# Patient Record
Sex: MALE | Race: WHITE | NOT HISPANIC OR LATINO | Employment: OTHER | URBAN - NONMETROPOLITAN AREA
[De-identification: names, ages, dates, MRNs, and addresses within clinical notes are randomized per-mention and may not be internally consistent; named-entity substitution may affect disease eponyms.]

---

## 2021-05-06 ENCOUNTER — OFFICE VISIT (OUTPATIENT)
Dept: CARDIOLOGY | Facility: CLINIC | Age: 76
End: 2021-05-06

## 2021-05-06 VITALS
OXYGEN SATURATION: 98 % | SYSTOLIC BLOOD PRESSURE: 136 MMHG | WEIGHT: 163 LBS | TEMPERATURE: 97.8 F | HEIGHT: 71 IN | HEART RATE: 66 BPM | RESPIRATION RATE: 12 BRPM | BODY MASS INDEX: 22.82 KG/M2 | DIASTOLIC BLOOD PRESSURE: 78 MMHG

## 2021-05-06 DIAGNOSIS — E78.5 HYPERLIPIDEMIA LDL GOAL <100: ICD-10-CM

## 2021-05-06 DIAGNOSIS — R01.1 HEART MURMUR: ICD-10-CM

## 2021-05-06 DIAGNOSIS — E11.65 TYPE 2 DIABETES MELLITUS WITH HYPERGLYCEMIA, WITHOUT LONG-TERM CURRENT USE OF INSULIN (HCC): ICD-10-CM

## 2021-05-06 DIAGNOSIS — I10 ESSENTIAL HYPERTENSION: Primary | ICD-10-CM

## 2021-05-06 PROBLEM — E11.9 TYPE 2 DIABETES MELLITUS, WITHOUT LONG-TERM CURRENT USE OF INSULIN: Status: ACTIVE | Noted: 2021-05-06

## 2021-05-06 PROCEDURE — 99204 OFFICE O/P NEW MOD 45 MIN: CPT | Performed by: INTERNAL MEDICINE

## 2021-05-06 PROCEDURE — 93000 ELECTROCARDIOGRAM COMPLETE: CPT | Performed by: INTERNAL MEDICINE

## 2021-05-06 RX ORDER — ASPIRIN 81 MG/1
TABLET ORAL
COMMUNITY

## 2021-05-06 RX ORDER — TAMSULOSIN HYDROCHLORIDE 0.4 MG/1
1 CAPSULE ORAL DAILY
COMMUNITY

## 2021-05-07 NOTE — PROGRESS NOTES
[facility] [place]  [facility] CARDIOLOGY  [address] [place] [address]  Dept: [phone]  Dept Fax: [phone]    Mahesh Renee  1945    New Patient Office Note    History of Present Illness:  Mahesh Renee is a 75 y.o. male who presents to the clinic for New patient.  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    Medications:  albuterol sulfate HFA  amLODIPine  aspirin  FLONASE SENSIMIST NA  glucose blood  Jardiance tablet  latanoprost  lisinopril-hydrochlorothiazide  metFORMIN  multivitamin tablet  PROSTATE HEALTH PO  rosuvastatin  tamsulosin capsule  Turmeric capsule  Vitamin D3 tablet    Subjective  No Known Allergies     Past Medical History:   Diagnosis Date   • Acute allergic rhinitis    • Benign essential hypertension    • Benign prostatic hyperplasia with urinary obstruction    • Bifascicular block    • BMI 23.0-23.9, adult    • Cholecystitis    • Chronic allergic rhinitis    • Diabetes mellitus without complication (CMS/HCC)    • Erectile dysfunction    • Extrinsic asthma    • High risk medication use    • Low back pain    • Mild intermittent asthma without complication    • Mixed hyperlipidemia    • Nocturia    • Nonrheumatic tricuspid valve regurgitation    • Obstructive sleep apnea syndrome    • Other obstructive and reflux uropathy    • Secondary pulmonary arterial hypertension (CMS/HCC)    •  "Tricuspid valve insufficiency    • Vitamin D deficiency        Past Surgical History:   Procedure Laterality Date   • CHOLECYSTECTOMY     • INGUINAL HERNIA REPAIR     • OTHER SURGICAL HISTORY      UVULA SURGER       Family History   Problem Relation Age of Onset   • Stroke Mother    • Other Mother         CAROTID OR VASCULAR DISEASE   • Hypertension Mother    • Hyperlipidemia Mother    • Arthritis Mother    • Breast cancer Mother    • Hypertension Father    • Arthritis Father    • Hyperlipidemia Father    • Other Father         CAROTID OR VASCULAR DISEASE   • Coronary artery disease Father    • Breast cancer Sister    • Hypertension Sister    • Diabetes Sister    • Hyperlipidemia Sister         Social History     Socioeconomic History   • Marital status:      Spouse name: Not on file   • Number of children: Not on file   • Years of education: Not on file   • Highest education level: Not on file   Tobacco Use   • Smoking status: Never Smoker   • Smokeless tobacco: Never Used   Substance and Sexual Activity   • Alcohol use: Not Currently   • Drug use: Never   • Sexual activity: Defer       Review of Systems   Constitutional: Negative.    HENT: Negative.    Respiratory: Negative.    Cardiovascular: Negative.    Endocrine: Negative.    Genitourinary: Negative.    Musculoskeletal: Negative.    Skin: Negative.    Allergic/Immunologic: Negative.    Neurological: Negative.    Hematological: Negative.    Psychiatric/Behavioral: Negative.        Cardiovascular Procedures    ECHO/MUGA:   STRESS TESTS:   CARDIAC CATH:   DEVICES:   HOLTER:   CT/MRI:   VASCULAR:   CARDIOTHORACIC:     Objective  Vitals:    05/06/21 1114   BP: 136/78   BP Location: Left arm   Patient Position: Lying   Cuff Size: Adult   Pulse: 66   Resp: 12   Temp: 97.8 °F (36.6 °C)   TempSrc: Infrared   SpO2: 98%   Weight: 73.9 kg (163 lb)   Height: 180.3 cm (71\")   PainSc: 0-No pain       Physical Exam  Constitutional:       Appearance: Healthy " appearance. Not in distress.   Neck:      Vascular: No JVR. JVD normal.   Pulmonary:      Effort: Pulmonary effort is normal.      Breath sounds: Normal breath sounds. No wheezing. No rhonchi. No rales.   Chest:      Chest wall: Not tender to palpatation.   Cardiovascular:      PMI at left midclavicular line. Normal rate. Regular rhythm. Normal S1. Normal S2.      Murmurs: There is a harsh midsystolic murmur at the URSB, radiating to the neck.      No gallop. No click. No rub.   Pulses:     Intact distal pulses.   Edema:     Peripheral edema absent.   Abdominal:      General: Bowel sounds are normal.      Palpations: Abdomen is soft.      Tenderness: There is no abdominal tenderness.   Musculoskeletal: Normal range of motion.         General: No tenderness. Skin:     General: Skin is warm and dry.   Neurological:      General: No focal deficit present.      Mental Status: Alert and oriented to person, place and time.          Diagnostic Data    ECG 12 Lead    Date/Time: 5/6/2021 10:41 PM  Performed by: Joseph Armstrong MD  Authorized by: Joseph Armstrong MD   Comparison: compared with previous ECG   Similar to previous ECG  Rhythm: sinus rhythm  Rate: normal  BPM: 67  Conduction: right bundle branch block  QRS axis: left              Assessment and Plan  Diagnoses and all orders for this visit:    Essential hypertension- The BP is fine, will keep same meds 140.80 on Lisinopril 20.25, Amlodipine 5mg    Hyperlipidemia LDL goal <100- On Crestor 5mg    Type 2 diabetes mellitus with hyperglycemia, without long-term current use of insulin (CMS/[facility])    Heart murmur- DAVID  Will review an echo  -     Adult Transthoracic Echo Complete W/ Cont if Necessary Per Protocol; Future  Bifascicular block- RBBB and LASHB, seems old, will observe     Other orders  -     Fluticasone Furoate (FLONASE SENSIMIST NA); fluticasone furoate   50mcg  -     tamsulosin (FLOMAX) 0.4 MG capsule 24 hr capsule; Take 1 capsule by mouth  Daily.  -     aspirin (aspirin) 81 MG EC tablet; aspirin 81 mg tablet,delayed release   Take 1 tablet every day by oral route.  -     Turmeric 450 MG capsule; turmeric        Return in about 3 weeks (around 5/27/2021) for Recheck.    Joseph Armstrong MD  05/06/2021

## 2021-05-27 ENCOUNTER — OFFICE VISIT (OUTPATIENT)
Dept: CARDIOLOGY | Facility: CLINIC | Age: 76
End: 2021-05-27

## 2021-05-27 VITALS
HEART RATE: 66 BPM | OXYGEN SATURATION: 96 % | HEIGHT: 71 IN | RESPIRATION RATE: 12 BRPM | WEIGHT: 168 LBS | BODY MASS INDEX: 23.52 KG/M2 | SYSTOLIC BLOOD PRESSURE: 130 MMHG | DIASTOLIC BLOOD PRESSURE: 70 MMHG | TEMPERATURE: 97 F

## 2021-05-27 DIAGNOSIS — I35.0 AORTIC STENOSIS, MILD: ICD-10-CM

## 2021-05-27 DIAGNOSIS — I10 ESSENTIAL HYPERTENSION: Primary | ICD-10-CM

## 2021-05-27 DIAGNOSIS — E78.5 HYPERLIPIDEMIA LDL GOAL <100: ICD-10-CM

## 2021-05-27 DIAGNOSIS — E11.65 TYPE 2 DIABETES MELLITUS WITH HYPERGLYCEMIA, WITHOUT LONG-TERM CURRENT USE OF INSULIN (HCC): ICD-10-CM

## 2021-05-27 PROCEDURE — 99214 OFFICE O/P EST MOD 30 MIN: CPT | Performed by: INTERNAL MEDICINE

## 2021-05-27 RX ORDER — DOXYCYCLINE HYCLATE 100 MG/1
100 CAPSULE ORAL DAILY
COMMUNITY
End: 2022-04-04

## 2021-05-27 NOTE — PROGRESS NOTES
[facility] [place]  [facility] CARDIOLOGY  [address] [place] [address]  Dept: [phone]  Dept Fax: [phone]    Mahesh Renee  1945    Follow Up Office Visit Note    History of Present Illness:  Mahesh Renee is a 75 y.o. male who presents to the clinic for Follow-up. Heart Murmur- He underwent an echo and this showed Mild AS MARSHA 1,1 , he is asymptomatic, will follow him up in 1 year with an echo    The following portions of the patient's history were reviewed and updated as appropriate: allergies, current medications, past family history, past medical history, past social history, past surgical history and problem list.    Medications:  albuterol sulfate HFA  amLODIPine  aspirin  doxycycline  FLONASE SENSIMIST NA  glucose blood  Jardiance tablet  latanoprost  lisinopril-hydrochlorothiazide  metFORMIN  multivitamin tablet  PROSTATE HEALTH PO  rosuvastatin  tamsulosin capsule  Turmeric capsule  Vitamin D3 tablet    Subjective  No Known Allergies     Past Medical History:   Diagnosis Date   • Acute allergic rhinitis    • Benign essential hypertension    • Benign prostatic hyperplasia with urinary obstruction    • Bifascicular block    • BMI 23.0-23.9, adult    • Cholecystitis    • Chronic allergic rhinitis    • Diabetes mellitus without complication (CMS/HCC)    • Erectile dysfunction    • Extrinsic asthma    • High risk medication use    • Low back pain    • Mild intermittent asthma without complication    • Mixed hyperlipidemia    • Nocturia    • Nonrheumatic tricuspid valve regurgitation    • Obstructive sleep apnea syndrome    • Other obstructive and reflux uropathy    • Secondary pulmonary arterial hypertension (CMS/HCC)    • Tricuspid valve insufficiency    • Vitamin D deficiency        Past Surgical History:   Procedure Laterality Date   • CHOLECYSTECTOMY     • INGUINAL HERNIA REPAIR     • OTHER SURGICAL HISTORY      UVULA SURGER       Family History   Problem  "Relation Age of Onset   • Stroke Mother    • Other Mother         CAROTID OR VASCULAR DISEASE   • Hypertension Mother    • Hyperlipidemia Mother    • Arthritis Mother    • Breast cancer Mother    • Hypertension Father    • Arthritis Father    • Hyperlipidemia Father    • Other Father         CAROTID OR VASCULAR DISEASE   • Coronary artery disease Father    • Breast cancer Sister    • Hypertension Sister    • Diabetes Sister    • Hyperlipidemia Sister         Social History     Socioeconomic History   • Marital status:      Spouse name: Not on file   • Number of children: Not on file   • Years of education: Not on file   • Highest education level: Not on file   Tobacco Use   • Smoking status: Never Smoker   • Smokeless tobacco: Never Used   Substance and Sexual Activity   • Alcohol use: Not Currently   • Drug use: Never   • Sexual activity: Defer       Review of Systems   Constitutional: Negative.    HENT: Negative.    Respiratory: Negative.    Cardiovascular: Negative.    Endocrine: Negative.    Genitourinary: Negative.    Musculoskeletal: Negative.    Skin: Negative.    Allergic/Immunologic: Negative.    Neurological: Negative.    Hematological: Negative.    Psychiatric/Behavioral: Negative.    All other systems reviewed and are negative.      Cardiovascular Procedures    ECHO/MUGA: Adult Transthoracic Echo Complete W/ Cont if Necessary Per Protocol (05/26/2021 09:25)    STRESS TESTS:   CARDIAC CATH:   DEVICES:   HOLTER:   CT/MRI:   VASCULAR:   CARDIOTHORACIC:     Objective  Vitals:    05/27/21 0957   BP: 130/70   BP Location: Left arm   Patient Position: Sitting   Cuff Size: Adult   Pulse: 66   Resp: 12   Temp: 97 °F (36.1 °C)   TempSrc: Infrared   SpO2: 96%   Weight: 76.2 kg (168 lb)   Height: 180.3 cm (71\")   PainSc: 0-No pain     Body mass index is 23.43 kg/m².     Physical Exam  Constitutional:       Appearance: Healthy appearance. Not in distress.   Neck:      Vascular: No JVR. JVD normal. "   Pulmonary:      Effort: Pulmonary effort is normal.      Breath sounds: Normal breath sounds. No wheezing. No rhonchi. No rales.   Chest:      Chest wall: Not tender to palpatation.   Cardiovascular:      PMI at left midclavicular line. Normal rate. Regular rhythm. Normal S1. Normal S2.      Murmurs: There is a grade 4/6 harsh midsystolic murmur at the URSB, radiating to the neck.      No gallop. No click. No rub.   Pulses:     Intact distal pulses.   Edema:     Peripheral edema absent.   Abdominal:      General: Bowel sounds are normal.      Palpations: Abdomen is soft.      Tenderness: There is no abdominal tenderness.   Musculoskeletal: Normal range of motion.         General: No tenderness. Skin:     General: Skin is warm and dry.   Neurological:      General: No focal deficit present.      Mental Status: Alert and oriented to person, place and time.          Diagnostic Data  Procedures    Assessment and Plan  Diagnoses and all orders for this visit:    Essential hypertension- The BP seems fine on Lisinopril 20,25 and Amlodipine 5mg    Hyperlipidemia LDL goal <100- Lipids are good on Crestor 5mg    Type 2 diabetes mellitus with hyperglycemia, without long-term current use of insulin (CMS/HCC)    Aortic stenosis, mild by recent echo 1,1 MARSHA. Asymptomatic, will follow up in 1 year, we discuss complaints   -     Adult Transthoracic Echo Complete W/ Cont if Necessary Per Protocol; Future    Other orders  -     doxycycline (VIBRAMYCIN) 100 MG capsule; Take 100 mg by mouth Daily.         Return in about 1 year (around 5/27/2022) for Recheck.    Joseph Armstrong MD  05/27/2021

## 2022-04-04 ENCOUNTER — OFFICE VISIT (OUTPATIENT)
Dept: FAMILY MEDICINE CLINIC | Facility: CLINIC | Age: 77
End: 2022-04-04

## 2022-04-04 VITALS
DIASTOLIC BLOOD PRESSURE: 76 MMHG | HEIGHT: 71 IN | OXYGEN SATURATION: 99 % | WEIGHT: 172 LBS | BODY MASS INDEX: 24.08 KG/M2 | HEART RATE: 62 BPM | SYSTOLIC BLOOD PRESSURE: 120 MMHG

## 2022-04-04 DIAGNOSIS — I10 ESSENTIAL HYPERTENSION: ICD-10-CM

## 2022-04-04 DIAGNOSIS — E11.9 TYPE 2 DIABETES MELLITUS WITHOUT COMPLICATION, WITHOUT LONG-TERM CURRENT USE OF INSULIN: Primary | ICD-10-CM

## 2022-04-04 DIAGNOSIS — E78.5 HYPERLIPIDEMIA LDL GOAL <100: ICD-10-CM

## 2022-04-04 PROBLEM — N13.8 BENIGN PROSTATIC HYPERPLASIA WITH URINARY OBSTRUCTION: Status: ACTIVE | Noted: 2019-10-24

## 2022-04-04 PROBLEM — I07.1 TRICUSPID VALVE REGURGITATION: Status: ACTIVE | Noted: 2018-04-05

## 2022-04-04 PROBLEM — I27.21 PULMONARY ARTERIAL HYPERTENSION: Status: ACTIVE | Noted: 2018-04-05

## 2022-04-04 PROBLEM — E55.9 VITAMIN D DEFICIENCY: Status: ACTIVE | Noted: 2022-04-04

## 2022-04-04 PROBLEM — I45.2 BIFASCICULAR BLOCK: Status: ACTIVE | Noted: 2022-04-04

## 2022-04-04 PROBLEM — J30.9 ALLERGIC RHINITIS: Status: ACTIVE | Noted: 2017-10-05

## 2022-04-04 PROBLEM — N40.1 BENIGN PROSTATIC HYPERPLASIA WITH URINARY OBSTRUCTION: Status: ACTIVE | Noted: 2019-10-24

## 2022-04-04 PROBLEM — J45.20 MILD INTERMITTENT ASTHMA: Status: ACTIVE | Noted: 2022-04-04

## 2022-04-04 LAB — POC MICROALBUMIN URINE: 20

## 2022-04-04 PROCEDURE — 36415 COLL VENOUS BLD VENIPUNCTURE: CPT | Performed by: PHYSICIAN ASSISTANT

## 2022-04-04 PROCEDURE — 99204 OFFICE O/P NEW MOD 45 MIN: CPT | Performed by: PHYSICIAN ASSISTANT

## 2022-04-04 PROCEDURE — 82044 UR ALBUMIN SEMIQUANTITATIVE: CPT | Performed by: PHYSICIAN ASSISTANT

## 2022-04-04 RX ORDER — ROSUVASTATIN CALCIUM 5 MG/1
5 TABLET, COATED ORAL DAILY
Qty: 90 TABLET | Refills: 1 | Status: SHIPPED | OUTPATIENT
Start: 2022-04-04 | End: 2022-10-04 | Stop reason: SDUPTHER

## 2022-04-04 RX ORDER — LISINOPRIL AND HYDROCHLOROTHIAZIDE 25; 20 MG/1; MG/1
1 TABLET ORAL DAILY
Qty: 90 TABLET | Refills: 1 | Status: SHIPPED | OUTPATIENT
Start: 2022-04-04 | End: 2022-10-04 | Stop reason: SDUPTHER

## 2022-04-04 RX ORDER — AMLODIPINE BESYLATE 10 MG/1
10 TABLET ORAL DAILY
Qty: 90 TABLET | Refills: 1 | Status: SHIPPED | OUTPATIENT
Start: 2022-04-04 | End: 2022-10-04 | Stop reason: SDUPTHER

## 2022-04-04 NOTE — PROGRESS NOTES
"Chief Complaint  Hyperlipidemia, Hypertension (Patient needs a refill on his med, he is fasting today.), and Diabetes (Patient needs a refill on his med)    Subjective          Mahesh Renee presents to [facility]  History of Present Illness   patient is here today for refills on his diabetic, hypertensive and cholesterol medications he also needs his test strips refilled.  He has no specific complaints today and reports feeling well.  He states his blood sugars are \"fairly well controlled\"  Objective     Vital Signs:   /76 (BP Location: Right arm, Patient Position: Sitting, Cuff Size: Adult)   Pulse 62   Ht 180.3 cm (71\")   Wt 78 kg (172 lb)   SpO2 99%   BMI 23.99 kg/m²     Body mass index is 23.99 kg/m².    Review of Systems   Constitutional: Negative.    HENT: Negative.    Respiratory: Negative.    Cardiovascular: Negative.    Gastrointestinal: Negative.    Neurological: Negative.    Psychiatric/Behavioral: Negative.        Past History:  Medical History: has a past medical history of Acute allergic rhinitis, Benign essential hypertension, Benign prostatic hyperplasia with urinary obstruction, Bifascicular block, BMI 23.0-23.9, adult, Cholecystitis, Chronic allergic rhinitis, Diabetes mellitus without complication (HCC), Erectile dysfunction, Extrinsic asthma, High risk medication use, Low back pain, Mild intermittent asthma without complication, Mixed hyperlipidemia, Nocturia, Nonrheumatic tricuspid valve regurgitation, Obstructive sleep apnea syndrome, Other obstructive and reflux uropathy, Secondary pulmonary arterial hypertension (HCC), Tricuspid valve insufficiency, and Vitamin D deficiency.   Surgical History: has a past surgical history that includes Other surgical history; Cholecystectomy; and Inguinal hernia repair.   Family History: family history includes Arthritis in his father and mother; Breast cancer in his mother and sister; Coronary artery disease in " his father; Diabetes in his sister; Hyperlipidemia in his father, mother, and sister; Hypertension in his father, mother, and sister; Other in his father and mother; Stroke in his mother.   Social History: reports that he has never smoked. He has never used smokeless tobacco. He reports previous alcohol use. He reports that he does not use drugs.      Current Outpatient Medications:   •  albuterol sulfate  (90 Base) MCG/ACT inhaler, Inhale 2 puffs Every 4 (Four) Hours As Needed for Wheezing., Disp: , Rfl:   •  amLODIPine (NORVASC) 10 MG tablet, Take 1 tablet by mouth Daily., Disp: 90 tablet, Rfl: 1  •  aspirin 81 MG EC tablet, aspirin 81 mg tablet,delayed release  Take 1 tablet every day by oral route., Disp: , Rfl:   •  empagliflozin (Jardiance) 25 MG tablet tablet, Take 1 tablet by mouth Every Morning., Disp: 90 tablet, Rfl: 1  •  Fluticasone Furoate (FLONASE SENSIMIST NA), fluticasone furoate  50mcg, Disp: , Rfl:   •  glucose blood test strip, 1 each by Other route Daily. Use as instructed  TRUETRACK TEST STRIPS, Disp: 100 each, Rfl: 1  •  latanoprost (XALATAN) 0.005 % ophthalmic solution, Apply 1 drop to eye(s) as directed by provider Daily., Disp: , Rfl:   •  lisinopril-hydrochlorothiazide (PRINZIDE,ZESTORETIC) 20-25 MG per tablet, Take 1 tablet by mouth Daily., Disp: 90 tablet, Rfl: 1  •  metFORMIN (GLUCOPHAGE) 500 MG tablet, Take 2 tablets by mouth Daily With Breakfast., Disp: 180 tablet, Rfl: 1  •  Misc Natural Products (PROSTATE HEALTH PO), Take  by mouth., Disp: , Rfl:   •  multivitamin (THERAGRAN) tablet tablet, Take 1 tablet by mouth Daily., Disp: , Rfl:   •  rosuvastatin (CRESTOR) 5 MG tablet, Take 1 tablet by mouth Daily., Disp: 90 tablet, Rfl: 1  •  tamsulosin (FLOMAX) 0.4 MG capsule 24 hr capsule, Take 1 capsule by mouth Daily., Disp: , Rfl:   •  Turmeric 450 MG capsule, turmeric, Disp: , Rfl:   •  Cholecalciferol (Vitamin D3) 50 MCG (2000 UT) tablet, Take 1 tablet by mouth Daily., Disp: ,  Rfl:     Allergies: Patient has no known allergies.    Physical Exam  Constitutional:       Appearance: Normal appearance.   Cardiovascular:      Rate and Rhythm: Normal rate and regular rhythm.      Pulses:           Dorsalis pedis pulses are 2+ on the right side and 2+ on the left side.        Posterior tibial pulses are 2+ on the right side and 2+ on the left side.      Heart sounds: Normal heart sounds.   Pulmonary:      Effort: Pulmonary effort is normal.      Breath sounds: Normal breath sounds.   Abdominal:      General: Bowel sounds are normal.      Palpations: Abdomen is soft.   Musculoskeletal:         General: Normal range of motion.      Cervical back: Normal range of motion.   Feet:      Right foot:      Protective Sensation: 5 sites tested. 5 sites sensed.      Skin integrity: Skin integrity normal.      Toenail Condition: Right toenails are normal.      Left foot:      Protective Sensation: 5 sites tested. 5 sites sensed.      Skin integrity: Skin integrity normal.      Toenail Condition: Left toenails are normal.   Neurological:      General: No focal deficit present.      Mental Status: He is alert and oriented to person, place, and time.   Psychiatric:         Mood and Affect: Mood normal.             Assessment and Plan   Diagnoses and all orders for this visit:    1. Type 2 diabetes mellitus without complication, without long-term current use of insulin (HCC) (Primary)  Assessment & Plan:  Continue present care no changes meds refilled.    Orders:  -     Comprehensive Metabolic Panel; Future  -     Hemoglobin A1c; Future    2. Essential hypertension  Assessment & Plan:  Continue present care no changes meds refilled.      3. Hyperlipidemia LDL goal <100  Assessment & Plan:  Continue present care no changes meds refilled.    Orders:  -     Lipid Panel; Future    Other orders  -     amLODIPine (NORVASC) 10 MG tablet; Take 1 tablet by mouth Daily.  Dispense: 90 tablet; Refill: 1  -     empagliflozin  (Jardiance) 25 MG tablet tablet; Take 1 tablet by mouth Every Morning.  Dispense: 90 tablet; Refill: 1  -     lisinopril-hydrochlorothiazide (PRINZIDE,ZESTORETIC) 20-25 MG per tablet; Take 1 tablet by mouth Daily.  Dispense: 90 tablet; Refill: 1  -     metFORMIN (GLUCOPHAGE) 500 MG tablet; Take 2 tablets by mouth Daily With Breakfast.  Dispense: 180 tablet; Refill: 1  -     rosuvastatin (CRESTOR) 5 MG tablet; Take 1 tablet by mouth Daily.  Dispense: 90 tablet; Refill: 1  -     glucose blood test strip; 1 each by Other route Daily. Use as instructed    TRUETRACK TEST STRIPS  Dispense: 100 each; Refill: 1          Follow Up   Return in about 6 months (around 10/4/2022) for Recheck Diabetes.  Patient was given instructions and counseling regarding his condition or for health maintenance advice. Please see specific information pulled into the AVS if appropriate.     Tete Linares PA-C

## 2022-04-05 ENCOUNTER — TELEPHONE (OUTPATIENT)
Dept: FAMILY MEDICINE CLINIC | Facility: CLINIC | Age: 77
End: 2022-04-05

## 2022-04-05 LAB
ALBUMIN SERPL-MCNC: 4.6 G/DL (ref 3.7–4.7)
ALBUMIN/GLOB SERPL: 2.1 {RATIO} (ref 1.2–2.2)
ALP SERPL-CCNC: 58 IU/L (ref 44–121)
ALT SERPL-CCNC: 21 IU/L (ref 0–44)
AST SERPL-CCNC: 18 IU/L (ref 0–40)
BILIRUB SERPL-MCNC: 0.7 MG/DL (ref 0–1.2)
BUN SERPL-MCNC: 15 MG/DL (ref 8–27)
BUN/CREAT SERPL: 20 (ref 10–24)
CALCIUM SERPL-MCNC: 9.5 MG/DL (ref 8.6–10.2)
CHLORIDE SERPL-SCNC: 100 MMOL/L (ref 96–106)
CHOLEST SERPL-MCNC: 123 MG/DL (ref 100–199)
CO2 SERPL-SCNC: 25 MMOL/L (ref 20–29)
CREAT SERPL-MCNC: 0.74 MG/DL (ref 0.76–1.27)
EGFRCR SERPLBLD CKD-EPI 2021: 95 ML/MIN/1.73
GLOBULIN SER CALC-MCNC: 2.2 G/DL (ref 1.5–4.5)
GLUCOSE SERPL-MCNC: 125 MG/DL (ref 65–99)
HBA1C MFR BLD: 6.5 % (ref 4.8–5.6)
HDLC SERPL-MCNC: 46 MG/DL
LDLC SERPL CALC-MCNC: 54 MG/DL (ref 0–99)
POTASSIUM SERPL-SCNC: 4 MMOL/L (ref 3.5–5.2)
PROT SERPL-MCNC: 6.8 G/DL (ref 6–8.5)
SODIUM SERPL-SCNC: 143 MMOL/L (ref 134–144)
TRIGL SERPL-MCNC: 130 MG/DL (ref 0–149)
VLDLC SERPL CALC-MCNC: 23 MG/DL (ref 5–40)

## 2022-04-05 NOTE — TELEPHONE ENCOUNTER
Pt says when he went to the pharm to  meds they told him we did not call in his test strips   When I asked him what kind he said he did not know what ever works with his machine    I looked in old system and is says truetrack

## 2022-04-07 ENCOUNTER — TELEPHONE (OUTPATIENT)
Dept: FAMILY MEDICINE CLINIC | Facility: CLINIC | Age: 77
End: 2022-04-07

## 2022-04-07 NOTE — TELEPHONE ENCOUNTER
Incoming Refill Request      Medication requested (name and dose):     TRUE METRIX BLOOD GLUCOSE TEST [facility]    [facility]    Additional details provided by patient:     2ND REQUEST    Best call back number:     [phone]    Does the patient have less than a 3 day supply:  [] Yes  [x] No    Luly Murray  04/07/22, 16:38 EDT

## 2022-04-08 ENCOUNTER — TELEPHONE (OUTPATIENT)
Dept: FAMILY MEDICINE CLINIC | Facility: CLINIC | Age: 77
End: 2022-04-08

## 2022-04-08 NOTE — TELEPHONE ENCOUNTER
PATIENT CALLED SAID THAT [facility] HAS STILL NOT RECEIVED THE TEST STRIPS THAT WERE SUPPOSED TO BE CALLED IN AFTER HIS VISIT ON 4/4/22. HE GOT THE REST OF THE MEDICATION THAT WAS CALLED IN, BUT NOT THE TEST STRIPS.

## 2022-04-12 ENCOUNTER — CLINICAL SUPPORT (OUTPATIENT)
Dept: FAMILY MEDICINE CLINIC | Facility: CLINIC | Age: 77
End: 2022-04-12

## 2022-04-12 DIAGNOSIS — J30.9 ALLERGIC RHINITIS, UNSPECIFIED SEASONALITY, UNSPECIFIED TRIGGER: ICD-10-CM

## 2022-04-12 PROCEDURE — 95117 IMMUNOTHERAPY INJECTIONS: CPT | Performed by: PHYSICIAN ASSISTANT

## 2022-05-10 ENCOUNTER — CLINICAL SUPPORT (OUTPATIENT)
Dept: FAMILY MEDICINE CLINIC | Facility: CLINIC | Age: 77
End: 2022-05-10

## 2022-05-10 DIAGNOSIS — J30.9 ALLERGIC RHINITIS, UNSPECIFIED SEASONALITY, UNSPECIFIED TRIGGER: Primary | ICD-10-CM

## 2022-05-10 PROCEDURE — 95117 IMMUNOTHERAPY INJECTIONS: CPT | Performed by: PHYSICIAN ASSISTANT

## 2022-05-27 ENCOUNTER — OFFICE VISIT (OUTPATIENT)
Dept: CARDIOLOGY | Facility: CLINIC | Age: 77
End: 2022-05-27

## 2022-05-27 VITALS
BODY MASS INDEX: 23.38 KG/M2 | SYSTOLIC BLOOD PRESSURE: 134 MMHG | WEIGHT: 167 LBS | HEIGHT: 71 IN | HEART RATE: 83 BPM | DIASTOLIC BLOOD PRESSURE: 72 MMHG | TEMPERATURE: 97 F | RESPIRATION RATE: 18 BRPM | OXYGEN SATURATION: 99 %

## 2022-05-27 DIAGNOSIS — E11.9 TYPE 2 DIABETES MELLITUS WITHOUT COMPLICATION, WITHOUT LONG-TERM CURRENT USE OF INSULIN: ICD-10-CM

## 2022-05-27 DIAGNOSIS — G47.33 OBSTRUCTIVE SLEEP APNEA SYNDROME: ICD-10-CM

## 2022-05-27 DIAGNOSIS — I45.2 BIFASCICULAR BLOCK: ICD-10-CM

## 2022-05-27 DIAGNOSIS — E78.5 HYPERLIPIDEMIA LDL GOAL <100: ICD-10-CM

## 2022-05-27 DIAGNOSIS — I10 ESSENTIAL HYPERTENSION: Primary | ICD-10-CM

## 2022-05-27 DIAGNOSIS — I35.0 AORTIC STENOSIS, MILD: ICD-10-CM

## 2022-05-27 PROBLEM — R01.1 HEART MURMUR: Status: RESOLVED | Noted: 2021-05-06 | Resolved: 2022-05-27

## 2022-05-27 PROCEDURE — 99214 OFFICE O/P EST MOD 30 MIN: CPT | Performed by: INTERNAL MEDICINE

## 2022-05-27 PROCEDURE — 93000 ELECTROCARDIOGRAM COMPLETE: CPT | Performed by: INTERNAL MEDICINE

## 2022-05-27 NOTE — PROGRESS NOTES
[facility] [place]  [facility] CARDIOLOGY  [address] [place] [address]  Dept: [phone]  Dept Fax: [phone]    Mahesh Renee  1945    Follow Up Office Visit Note    History of Present Illness:  Mahesh Renee is a 76 y.o. male who presents to the clinic for Follow up Aortic stenosis - He is asymptomatic, no chest pain, no SOB, no edema, BP is 140.80 EKg sinus with RBBB and lASHB, Hr is 60, recent echo no major changes MARSHA 1,2 cm , aortic velocity 3,0, mean gradient 20 , will keep same approach echo yearly    The following portions of the patient's history were reviewed and updated as appropriate: allergies, current medications, past family history, past medical history, past social history, past surgical history and problem list.    Medications:  albuterol sulfate HFA  amLODIPine  aspirin  empagliflozin tablet  FLONASE SENSIMIST NA  glucose blood  latanoprost  lisinopril-hydrochlorothiazide  metFORMIN  multivitamin tablet  PROSTATE HEALTH PO  rosuvastatin  tamsulosin capsule  Turmeric capsule  Vitamin D3 tablet    Subjective  No Known Allergies     Past Medical History:   Diagnosis Date   • Acute allergic rhinitis    • Benign essential hypertension    • Benign prostatic hyperplasia with urinary obstruction    • Bifascicular block    • BMI 23.0-23.9, adult    • Cholecystitis    • Chronic allergic rhinitis    • Diabetes mellitus without complication (HCC)    • Erectile dysfunction    • Extrinsic asthma    • High risk medication use    • Low back pain    • Mild intermittent asthma without complication    • Mixed hyperlipidemia    • Nocturia    • Nonrheumatic tricuspid valve regurgitation    • Obstructive sleep apnea syndrome    • Other obstructive and reflux uropathy    • Secondary pulmonary arterial hypertension (HCC)    • Tricuspid valve insufficiency    • Vitamin D deficiency        Past Surgical History:   Procedure Laterality Date   • CHOLECYSTECTOMY     • INGUINAL  "HERNIA REPAIR     • OTHER SURGICAL HISTORY      UVULA SURGER       Family History   Problem Relation Age of Onset   • Stroke Mother    • Other Mother         CAROTID OR VASCULAR DISEASE   • Hypertension Mother    • Hyperlipidemia Mother    • Arthritis Mother    • Breast cancer Mother    • Hypertension Father    • Arthritis Father    • Hyperlipidemia Father    • Other Father         CAROTID OR VASCULAR DISEASE   • Coronary artery disease Father    • Breast cancer Sister    • Hypertension Sister    • Diabetes Sister    • Hyperlipidemia Sister         Social History     Socioeconomic History   • Marital status:    Tobacco Use   • Smoking status: Never Smoker   • Smokeless tobacco: Never Used   Vaping Use   • Vaping Use: Never used   Substance and Sexual Activity   • Alcohol use: Not Currently   • Drug use: Never   • Sexual activity: Defer       Review of Systems   Constitutional: Negative.    HENT: Negative.    Respiratory: Negative.    Cardiovascular: Negative.    Endocrine: Negative.    Genitourinary: Negative.    Musculoskeletal: Negative.    Skin: Negative.    Allergic/Immunologic: Negative.    Neurological: Negative.    Hematological: Negative.    Psychiatric/Behavioral: Negative.        Cardiovascular Procedures    ECHO/MUGA:   STRESS TESTS:   CARDIAC CATH:   DEVICES:   HOLTER:   CT/MRI:   VASCULAR:   CARDIOTHORACIC:     Objective  Vitals:    05/27/22 1001   BP: 134/72   BP Location: Left arm   Patient Position: Sitting   Cuff Size: Adult   Pulse: 83   Resp: 18   Temp: 97 °F (36.1 °C)   TempSrc: Infrared   SpO2: 99%   Weight: 75.8 kg (167 lb)   Height: 180.3 cm (71\")   PainSc: 0-No pain     Body mass index is 23.29 kg/m².     Physical Exam  Constitutional:       Appearance: Healthy appearance. Not in distress.   Neck:      Vascular: No JVR. JVD normal.   Pulmonary:      Effort: Pulmonary effort is normal.      Breath sounds: Normal breath sounds. No wheezing. No rhonchi. No rales.   Chest:      Chest " wall: Not tender to palpatation.   Cardiovascular:      PMI at left midclavicular line. Normal rate. Regular rhythm. Normal S1. Normal S2.      Murmurs: There is a harsh midsystolic murmur at the URSB, radiating to the neck.      No gallop. No click. No rub.   Pulses:     Intact distal pulses.   Edema:     Peripheral edema absent.   Abdominal:      General: Bowel sounds are normal.      Palpations: Abdomen is soft.      Tenderness: There is no abdominal tenderness.   Musculoskeletal: Normal range of motion.         General: No tenderness. Skin:     General: Skin is warm and dry.   Neurological:      General: No focal deficit present.      Mental Status: Alert and oriented to person, place and time.          Diagnostic Data    ECG 12 Lead    Date/Time: 5/27/2022 10:54 AM  Performed by: Joseph Armstrong MD  Authorized by: Joseph Armstrong MD   Comparison: compared with previous ECG from 5/6/2021  Similar to previous ECG  Rhythm: sinus rhythm  Rate: normal  BPM: 60  Conduction: right bundle branch block and left anterior fascicular block  QRS axis: left    Clinical impression: abnormal EKG            Assessment and Plan  Diagnoses and all orders for this visit:    Essential hypertension- The BP is 140.80, will keep same meds amlodipine 10 mg and Lisinopril Hctz 20.25  -     Adult Transthoracic Echo Complete W/ Cont if Necessary Per Protocol; Future    Hyperlipidemia LDL goal <100- On Crestor 5mg. Lipids are good  -     Adult Transthoracic Echo Complete W/ Cont if Necessary Per Protocol; Future    Aortic stenosis, mild. No major changes MARSHA 1,2   -     Adult Transthoracic Echo Complete W/ Cont if Necessary Per Protocol; Future    Type 2 diabetes mellitus without complication, without long-term current use of insulin (HCC)    Bifascicular block- steady, no syncope, no palpitations, HR is good 60  -     Adult Transthoracic Echo Complete W/ Cont if Necessary Per Protocol; Future    Obstructive sleep apnea  syndrome- On CPAP         Return in about 1 year (around 5/27/2023) for Recheck.    Joseph Armstrong MD  05/27/2022

## 2022-06-08 ENCOUNTER — CLINICAL SUPPORT (OUTPATIENT)
Dept: FAMILY MEDICINE CLINIC | Facility: CLINIC | Age: 77
End: 2022-06-08

## 2022-06-08 DIAGNOSIS — J30.9 ALLERGIC RHINITIS, UNSPECIFIED SEASONALITY, UNSPECIFIED TRIGGER: Primary | ICD-10-CM

## 2022-06-08 PROCEDURE — 95117 IMMUNOTHERAPY INJECTIONS: CPT | Performed by: PHYSICIAN ASSISTANT

## 2022-07-07 ENCOUNTER — CLINICAL SUPPORT (OUTPATIENT)
Dept: FAMILY MEDICINE CLINIC | Facility: CLINIC | Age: 77
End: 2022-07-07

## 2022-07-07 DIAGNOSIS — J30.1 NON-SEASONAL ALLERGIC RHINITIS DUE TO POLLEN: Primary | ICD-10-CM

## 2022-07-07 PROCEDURE — 95117 IMMUNOTHERAPY INJECTIONS: CPT | Performed by: PHYSICIAN ASSISTANT

## 2022-08-03 ENCOUNTER — CLINICAL SUPPORT (OUTPATIENT)
Dept: FAMILY MEDICINE CLINIC | Facility: CLINIC | Age: 77
End: 2022-08-03

## 2022-08-03 DIAGNOSIS — J30.9 ALLERGIC RHINITIS, UNSPECIFIED SEASONALITY, UNSPECIFIED TRIGGER: Primary | ICD-10-CM

## 2022-08-03 PROCEDURE — 95117 IMMUNOTHERAPY INJECTIONS: CPT | Performed by: PHYSICIAN ASSISTANT

## 2022-08-31 ENCOUNTER — CLINICAL SUPPORT (OUTPATIENT)
Dept: FAMILY MEDICINE CLINIC | Facility: CLINIC | Age: 77
End: 2022-08-31

## 2022-08-31 DIAGNOSIS — J30.1 NON-SEASONAL ALLERGIC RHINITIS DUE TO POLLEN: Primary | ICD-10-CM

## 2022-08-31 PROCEDURE — 95117 IMMUNOTHERAPY INJECTIONS: CPT | Performed by: PHYSICIAN ASSISTANT

## 2022-09-28 ENCOUNTER — CLINICAL SUPPORT (OUTPATIENT)
Dept: FAMILY MEDICINE CLINIC | Facility: CLINIC | Age: 77
End: 2022-09-28

## 2022-09-28 DIAGNOSIS — J30.9 ALLERGIC RHINITIS, UNSPECIFIED SEASONALITY, UNSPECIFIED TRIGGER: Primary | ICD-10-CM

## 2022-09-28 PROCEDURE — 95117 IMMUNOTHERAPY INJECTIONS: CPT | Performed by: PHYSICIAN ASSISTANT

## 2022-10-04 ENCOUNTER — OFFICE VISIT (OUTPATIENT)
Dept: FAMILY MEDICINE CLINIC | Facility: CLINIC | Age: 77
End: 2022-10-04

## 2022-10-04 VITALS
BODY MASS INDEX: 24.08 KG/M2 | OXYGEN SATURATION: 100 % | SYSTOLIC BLOOD PRESSURE: 130 MMHG | HEART RATE: 64 BPM | HEIGHT: 71 IN | DIASTOLIC BLOOD PRESSURE: 70 MMHG | WEIGHT: 172 LBS

## 2022-10-04 DIAGNOSIS — E11.9 TYPE 2 DIABETES MELLITUS WITHOUT COMPLICATION, WITHOUT LONG-TERM CURRENT USE OF INSULIN: Primary | ICD-10-CM

## 2022-10-04 DIAGNOSIS — H40.1130 PRIMARY OPEN ANGLE GLAUCOMA OF BOTH EYES, UNSPECIFIED GLAUCOMA STAGE: ICD-10-CM

## 2022-10-04 DIAGNOSIS — N13.8 BPH WITH OBSTRUCTION/LOWER URINARY TRACT SYMPTOMS: ICD-10-CM

## 2022-10-04 DIAGNOSIS — Z11.59 NEED FOR HEPATITIS C SCREENING TEST: ICD-10-CM

## 2022-10-04 DIAGNOSIS — N40.1 BPH WITH OBSTRUCTION/LOWER URINARY TRACT SYMPTOMS: ICD-10-CM

## 2022-10-04 DIAGNOSIS — R01.1 CARDIAC MURMUR: ICD-10-CM

## 2022-10-04 DIAGNOSIS — J30.1 SEASONAL ALLERGIC RHINITIS DUE TO POLLEN: ICD-10-CM

## 2022-10-04 DIAGNOSIS — E78.2 HYPERLIPIDEMIA, MIXED: ICD-10-CM

## 2022-10-04 DIAGNOSIS — E55.9 VITAMIN D DEFICIENCY: ICD-10-CM

## 2022-10-04 DIAGNOSIS — E78.2 MIXED HYPERLIPIDEMIA: ICD-10-CM

## 2022-10-04 DIAGNOSIS — I10 ESSENTIAL HYPERTENSION: ICD-10-CM

## 2022-10-04 DIAGNOSIS — J45.20 MILD INTERMITTENT ASTHMA WITHOUT COMPLICATION: Chronic | ICD-10-CM

## 2022-10-04 DIAGNOSIS — I10 HYPERTENSION, ESSENTIAL: ICD-10-CM

## 2022-10-04 DIAGNOSIS — I36.1 NONRHEUMATIC TRICUSPID VALVE REGURGITATION: ICD-10-CM

## 2022-10-04 DIAGNOSIS — I35.0 AORTIC STENOSIS, MILD: ICD-10-CM

## 2022-10-04 DIAGNOSIS — I45.2 BIFASCICULAR BLOCK: ICD-10-CM

## 2022-10-04 PROBLEM — I07.1 TRICUSPID VALVE REGURGITATION: Chronic | Status: ACTIVE | Noted: 2018-04-05

## 2022-10-04 PROCEDURE — 99214 OFFICE O/P EST MOD 30 MIN: CPT | Performed by: FAMILY MEDICINE

## 2022-10-04 PROCEDURE — 36415 COLL VENOUS BLD VENIPUNCTURE: CPT | Performed by: FAMILY MEDICINE

## 2022-10-04 RX ORDER — AMLODIPINE BESYLATE 10 MG/1
10 TABLET ORAL DAILY
Qty: 90 TABLET | Refills: 1 | Status: SHIPPED | OUTPATIENT
Start: 2022-10-04 | End: 2023-03-07 | Stop reason: SDUPTHER

## 2022-10-04 RX ORDER — LISINOPRIL AND HYDROCHLOROTHIAZIDE 25; 20 MG/1; MG/1
1 TABLET ORAL DAILY
Qty: 90 TABLET | Refills: 1 | Status: SHIPPED | OUTPATIENT
Start: 2022-10-04 | End: 2023-03-07 | Stop reason: SINTOL

## 2022-10-04 RX ORDER — ROSUVASTATIN CALCIUM 5 MG/1
5 TABLET, COATED ORAL DAILY
Qty: 90 TABLET | Refills: 1 | Status: SHIPPED | OUTPATIENT
Start: 2022-10-04 | End: 2023-03-07 | Stop reason: SDUPTHER

## 2022-10-04 NOTE — PROGRESS NOTES
"Chief Complaint  Hypertension (Needing medication refilled) and Diabetes (Needing medication refilled)    Subjective    History of Present Illness:  Mahesh Renee is a 76 y.o. male who presents today for follow-up visit and medication refills.    Here to establish care with me.  Transferring from [facility].    History of diabetes, hypertension, hyperlipidemia, asthma on allergy shots, tricuspid regurgitation with chronic heart murmur and bifascicular block, BPH stable on Flomax with ongoing urology follow-up (Dr. Byers) with yearly PSA monitoring, and history of glaucoma with ongoing ophthalmology follow-up and management    Colonoscopy done last in 2016.  He did have 1 small polyp but was told to repeat in 10 years.  Requesting [facility] pathology from GI.    PSA up-to-date with urology.    Due for diabetic foot exam and he will consider getting that up-to-date at his annual wellness visit together.    Discussed vaccination updates and he will consider an annual wellness.    Fasting to get blood work up-to-date.    Objective   Vital Signs:   /70 (BP Location: Left arm, Patient Position: Sitting, Cuff Size: Adult)   Pulse 64   Ht 180.3 cm (71\")   Wt 78 kg (172 lb)   SpO2 100%   BMI 23.99 kg/m²     Review of Systems   Constitutional: Negative for appetite change, chills and fever.   HENT: Negative for hearing loss.         Vision issues stable with ongoing management from ophthalmology.   Eyes: Negative for blurred vision.   Respiratory: Negative for chest tightness.    Cardiovascular: Negative for chest pain.   Gastrointestinal: Negative for abdominal pain.   Musculoskeletal: Negative for gait problem.   Skin: Negative for rash.   Psychiatric/Behavioral: Negative for depressed mood.       Past History:  Medical History: has a past medical history of Acute allergic rhinitis, Benign essential hypertension, Benign prostatic hyperplasia with urinary obstruction, Bifascicular block, BMI 23.0-23.9, " adult, Cholecystitis, Chronic allergic rhinitis, Diabetes mellitus without complication (HCC), Erectile dysfunction, Extrinsic asthma, High risk medication use, Low back pain, Mild intermittent asthma without complication, Mixed hyperlipidemia, Nocturia, Nonrheumatic tricuspid valve regurgitation, Obstructive sleep apnea syndrome, Other obstructive and reflux uropathy, Secondary pulmonary arterial hypertension (HCC), Tricuspid valve insufficiency, and Vitamin D deficiency.   Surgical History: has a past surgical history that includes Other surgical history; Cholecystectomy; and Inguinal hernia repair.   Family History: family history includes Arthritis in his father and mother; Breast cancer in his mother and sister; Coronary artery disease in his father; Diabetes in his sister; Hyperlipidemia in his father, mother, and sister; Hypertension in his father, mother, and sister; Other in his father and mother; Stroke in his mother.   Social History: reports that he has never smoked. He has never used smokeless tobacco. He reports previous alcohol use. He reports that he does not use drugs.      Current Outpatient Medications:   •  albuterol sulfate  (90 Base) MCG/ACT inhaler, Inhale 2 puffs Every 4 (Four) Hours As Needed for Wheezing., Disp: , Rfl:   •  amLODIPine (NORVASC) 10 MG tablet, Take 1 tablet by mouth Daily., Disp: 90 tablet, Rfl: 1  •  aspirin 81 MG EC tablet, aspirin 81 mg tablet,delayed release  Take 1 tablet every day by oral route., Disp: , Rfl:   •  Cholecalciferol (Vitamin D3) 50 MCG (2000 UT) tablet, Take 1 tablet by mouth Daily., Disp: , Rfl:   •  empagliflozin (Jardiance) 25 MG tablet tablet, Take 1 tablet by mouth Every Morning., Disp: 90 tablet, Rfl: 1  •  Fluticasone Furoate (FLONASE SENSIMIST NA), fluticasone furoate  50mcg, Disp: , Rfl:   •  glucose blood test strip, 1 each by Other route Daily. Use as instructed. Dx E11.9  TRUEMETRIX TEST STRIPS, Disp: 100 each, Rfl: 1  •  latanoprost  (XALATAN) 0.005 % ophthalmic solution, Apply 1 drop to eye(s) as directed by provider Daily., Disp: , Rfl:   •  lisinopril-hydrochlorothiazide (PRINZIDE,ZESTORETIC) 20-25 MG per tablet, Take 1 tablet by mouth Daily., Disp: 90 tablet, Rfl: 1  •  metFORMIN (GLUCOPHAGE) 500 MG tablet, Take 1 tablet by mouth 2 (Two) Times a Day With Meals., Disp: 180 tablet, Rfl: 1  •  Misc Natural Products (PROSTATE HEALTH PO), Take  by mouth., Disp: , Rfl:   •  multivitamin (THERAGRAN) tablet tablet, Take 1 tablet by mouth Daily., Disp: , Rfl:   •  rosuvastatin (CRESTOR) 5 MG tablet, Take 1 tablet by mouth Daily., Disp: 90 tablet, Rfl: 1  •  tamsulosin (FLOMAX) 0.4 MG capsule 24 hr capsule, Take 1 capsule by mouth Daily., Disp: , Rfl:   •  Turmeric 450 MG capsule, turmeric, Disp: , Rfl:     Allergies: Patient has no known allergies.    Physical Exam  Constitutional:       Appearance: Normal appearance.   HENT:      Head: Normocephalic.      Right Ear: External ear normal.      Left Ear: External ear normal.      Nose: Nose normal.   Eyes:      Pupils: Pupils are equal, round, and reactive to light.   Cardiovascular:      Rate and Rhythm: Normal rate and regular rhythm.      Heart sounds: Murmur heard.     No friction rub. No gallop.      Comments: 2-3 out of 6 systolic murmur heard best at left sternal border.    Cardiology following-Dr. Armstrong  Pulmonary:      Effort: Pulmonary effort is normal.      Breath sounds: Normal breath sounds.   Musculoskeletal:         General: Normal range of motion.      Cervical back: Normal range of motion.   Skin:     General: Skin is warm and dry.   Neurological:      General: No focal deficit present.      Mental Status: He is alert.   Psychiatric:         Mood and Affect: Mood normal.         Behavior: Behavior normal.         Thought Content: Thought content normal.          Result Review                   Assessment and Plan  Diagnoses and all orders for this visit:    1. Type 2 diabetes  mellitus without complication, without long-term current use of insulin (HCC) (Primary)  Assessment & Plan:  Diabetes is improving with treatment.   Continue current treatment regimen.  Diabetes will be reassessed in 3 months.    Orders:  -     empagliflozin (Jardiance) 25 MG tablet tablet; Take 1 tablet by mouth Every Morning.  Dispense: 90 tablet; Refill: 1  -     lisinopril-hydrochlorothiazide (PRINZIDE,ZESTORETIC) 20-25 MG per tablet; Take 1 tablet by mouth Daily.  Dispense: 90 tablet; Refill: 1  -     metFORMIN (GLUCOPHAGE) 500 MG tablet; Take 1 tablet by mouth 2 (Two) Times a Day With Meals.  Dispense: 180 tablet; Refill: 1  -     glucose blood test strip; 1 each by Other route Daily. Use as instructed. Dx E11.9    TRUEMETRIX TEST STRIPS  Dispense: 100 each; Refill: 1  -     CBC Auto Differential; Future  -     Comprehensive Metabolic Panel; Future  -     Lipid Panel; Future  -     Hemoglobin A1c; Future  -     TSH; Future  -     T4, Free; Future  -     Vitamin B12; Future    2. Mixed hyperlipidemia  Assessment & Plan:  Lipid abnormalities are improving with treatment.  Pharmacotherapy as ordered.  Lipids will be reassessed in 3 months.    Orders:  -     rosuvastatin (CRESTOR) 5 MG tablet; Take 1 tablet by mouth Daily.  Dispense: 90 tablet; Refill: 1  -     CBC Auto Differential; Future  -     Comprehensive Metabolic Panel; Future  -     Lipid Panel; Future  -     TSH; Future  -     T4, Free; Future    3. Mild intermittent asthma without complication  Assessment & Plan:  Doing well with allergy shots and Flonase along with as needed use of albuterol inhaler.    Allergy follow-up ongoing        4. Essential hypertension  -     amLODIPine (NORVASC) 10 MG tablet; Take 1 tablet by mouth Daily.  Dispense: 90 tablet; Refill: 1  -     lisinopril-hydrochlorothiazide (PRINZIDE,ZESTORETIC) 20-25 MG per tablet; Take 1 tablet by mouth Daily.  Dispense: 90 tablet; Refill: 1  -     CBC Auto Differential; Future  -      Comprehensive Metabolic Panel; Future  -     Lipid Panel; Future  -     TSH; Future  -     T4, Free; Future    5. Primary open angle glaucoma of both eyes, unspecified glaucoma stage  Assessment & Plan:  Continue with ophthalmology      6. Hypertension, essential  Assessment & Plan:  Hypertension is improving with treatment.  Continue current treatment regimen.  Blood pressure will be reassessed at the next regular appointment.      7. Hyperlipidemia, mixed  Assessment & Plan:  Lipid abnormalities are improving with treatment.  Pharmacotherapy as ordered.  Lipids will be reassessed in 6 months.      8. Seasonal allergic rhinitis due to pollen  Assessment & Plan:  Continues with Flonase and allergy shots.  Ongoing follow-up with his allergist      9. BPH with obstruction/lower urinary tract symptoms  Assessment & Plan:  Ongoing urology follow-up.  Continues Flomax      10. Vitamin D deficiency  -     Vitamin D 25 Hydroxy; Future    11. Need for hepatitis C screening test  Assessment & Plan:  Discussed hep C screening and he would like this done with lab work today.    Orders:  -     HCV Antibody Rfx To Qnt PCR; Future    12. Cardiac murmur    13. Nonrheumatic tricuspid valve regurgitation  Assessment & Plan:  Ongoing follow-up with cardiology.  Murmur appreciated for the first time on exam today as he is establishing care with me.  No shortness of breath or chest pain or palpitation symptoms      14. Bifascicular block  Assessment & Plan:  Cardiology follow-up ongoing.  Heart rate in the 60s.  He does report they told him he would need a pacemaker at some point in the future but at this point is stable without intervention      15. Aortic stenosis, mild      BMI is within normal parameters. No other follow-up for BMI required.          Follow Up  Return in about 4 weeks (around 11/1/2022) for Annual physical, Medicare Wellness, Med recheck.    Sarkis Fairbanks MD

## 2022-10-04 NOTE — ASSESSMENT & PLAN NOTE
Doing well with allergy shots and Flonase along with as needed use of albuterol inhaler.    Allergy follow-up ongoing

## 2022-10-04 NOTE — ASSESSMENT & PLAN NOTE
Ongoing follow-up with cardiology.  Murmur appreciated for the first time on exam today as he is establishing care with me.  No shortness of breath or chest pain or palpitation symptoms

## 2022-10-04 NOTE — ASSESSMENT & PLAN NOTE
Cardiology follow-up ongoing.  Heart rate in the 60s.  He does report they told him he would need a pacemaker at some point in the future but at this point is stable without intervention

## 2022-10-05 LAB
25(OH)D3+25(OH)D2 SERPL-MCNC: 48.7 NG/ML (ref 30–100)
ALBUMIN SERPL-MCNC: 5 G/DL (ref 3.7–4.7)
ALBUMIN/GLOB SERPL: 2.1 {RATIO} (ref 1.2–2.2)
ALP SERPL-CCNC: 72 IU/L (ref 44–121)
ALT SERPL-CCNC: 23 IU/L (ref 0–44)
AST SERPL-CCNC: 19 IU/L (ref 0–40)
BASOPHILS # BLD AUTO: 0 X10E3/UL (ref 0–0.2)
BASOPHILS NFR BLD AUTO: 1 %
BILIRUB SERPL-MCNC: 0.5 MG/DL (ref 0–1.2)
BUN SERPL-MCNC: 14 MG/DL (ref 8–27)
BUN/CREAT SERPL: 18 (ref 10–24)
CALCIUM SERPL-MCNC: 9.5 MG/DL (ref 8.6–10.2)
CHLORIDE SERPL-SCNC: 100 MMOL/L (ref 96–106)
CHOLEST SERPL-MCNC: 146 MG/DL (ref 100–199)
CO2 SERPL-SCNC: 26 MMOL/L (ref 20–29)
CREAT SERPL-MCNC: 0.76 MG/DL (ref 0.76–1.27)
EGFRCR SERPLBLD CKD-EPI 2021: 94 ML/MIN/1.73
EOSINOPHIL # BLD AUTO: 0.2 X10E3/UL (ref 0–0.4)
EOSINOPHIL NFR BLD AUTO: 3 %
ERYTHROCYTE [DISTWIDTH] IN BLOOD BY AUTOMATED COUNT: 12.8 % (ref 11.6–15.4)
GLOBULIN SER CALC-MCNC: 2.4 G/DL (ref 1.5–4.5)
GLUCOSE SERPL-MCNC: 127 MG/DL (ref 70–99)
HBA1C MFR BLD: 6.9 % (ref 4.8–5.6)
HCT VFR BLD AUTO: 50 % (ref 37.5–51)
HCV AB S/CO SERPL IA: <0.1 S/CO RATIO (ref 0–0.9)
HCV AB SERPL QL IA: NORMAL
HDLC SERPL-MCNC: 50 MG/DL
HGB BLD-MCNC: 16.5 G/DL (ref 13–17.7)
IMM GRANULOCYTES # BLD AUTO: 0 X10E3/UL (ref 0–0.1)
IMM GRANULOCYTES NFR BLD AUTO: 0 %
LDLC SERPL CALC-MCNC: 69 MG/DL (ref 0–99)
LYMPHOCYTES # BLD AUTO: 2.1 X10E3/UL (ref 0.7–3.1)
LYMPHOCYTES NFR BLD AUTO: 39 %
MCH RBC QN AUTO: 29.4 PG (ref 26.6–33)
MCHC RBC AUTO-ENTMCNC: 33 G/DL (ref 31.5–35.7)
MCV RBC AUTO: 89 FL (ref 79–97)
MONOCYTES # BLD AUTO: 0.6 X10E3/UL (ref 0.1–0.9)
MONOCYTES NFR BLD AUTO: 10 %
NEUTROPHILS # BLD AUTO: 2.5 X10E3/UL (ref 1.4–7)
NEUTROPHILS NFR BLD AUTO: 47 %
PLATELET # BLD AUTO: 207 X10E3/UL (ref 150–450)
POTASSIUM SERPL-SCNC: 3.6 MMOL/L (ref 3.5–5.2)
PROT SERPL-MCNC: 7.4 G/DL (ref 6–8.5)
RBC # BLD AUTO: 5.61 X10E6/UL (ref 4.14–5.8)
SODIUM SERPL-SCNC: 145 MMOL/L (ref 134–144)
T4 FREE SERPL-MCNC: 1.45 NG/DL (ref 0.82–1.77)
TRIGL SERPL-MCNC: 157 MG/DL (ref 0–149)
TSH SERPL DL<=0.005 MIU/L-ACNC: 3.1 UIU/ML (ref 0.45–4.5)
VIT B12 SERPL-MCNC: 459 PG/ML (ref 232–1245)
VLDLC SERPL CALC-MCNC: 27 MG/DL (ref 5–40)
WBC # BLD AUTO: 5.4 X10E3/UL (ref 3.4–10.8)

## 2022-10-28 ENCOUNTER — CLINICAL SUPPORT (OUTPATIENT)
Dept: FAMILY MEDICINE CLINIC | Facility: CLINIC | Age: 77
End: 2022-10-28

## 2022-10-28 DIAGNOSIS — J30.9 ALLERGIC RHINITIS, UNSPECIFIED SEASONALITY, UNSPECIFIED TRIGGER: ICD-10-CM

## 2022-10-28 DIAGNOSIS — E11.9 TYPE 2 DIABETES MELLITUS WITHOUT COMPLICATION, WITHOUT LONG-TERM CURRENT USE OF INSULIN: ICD-10-CM

## 2022-10-28 PROCEDURE — 95117 IMMUNOTHERAPY INJECTIONS: CPT | Performed by: FAMILY MEDICINE

## 2022-11-01 ENCOUNTER — TELEPHONE (OUTPATIENT)
Dept: FAMILY MEDICINE CLINIC | Facility: CLINIC | Age: 77
End: 2022-11-01

## 2022-11-01 DIAGNOSIS — Z12.11 SCREENING FOR COLON CANCER: ICD-10-CM

## 2022-11-01 DIAGNOSIS — Z86.010 PERSONAL HISTORY OF COLONIC POLYPS: Primary | ICD-10-CM

## 2022-11-01 NOTE — TELEPHONE ENCOUNTER
Left voicemail for patient to call back, please see Dr. Minor message below    HUB CAN READ      ----- Message from Sarkis Fairbanks MD sent at 11/1/2022  8:59 AM EDT -----  THE MESSAGE BELOW IS ABLE TO BE GIVEN BY THE HUB.  THE HUB MAY SCHEDULE A FOLLOW-UP VISIT FOR THE PATIENT IF INDICATED IN THE MESSAGE BELOW...    Please call and let patient know that we received his colonoscopy report from December 2016.  He did have 1 polyp with recommendation to repeat in 5 years.    He is past due for follow-up colonoscopy given his polyp history.    I did put an order in his chart to get a colonoscopy up-to-date given his past history of polyps.    They will contact him with information to schedule the colonoscopy.

## 2022-11-01 NOTE — TELEPHONE ENCOUNTER
HUB RELAYED MESSAGE TO PATIENT. PATIENT STATES THAT HE WILL TALK MORE ABOUT THIS WITH DR HERNÁNDEZ AT HIS APPOINTMENT ON 11/7/22

## 2022-11-07 ENCOUNTER — OFFICE VISIT (OUTPATIENT)
Dept: FAMILY MEDICINE CLINIC | Facility: CLINIC | Age: 77
End: 2022-11-07

## 2022-11-07 VITALS
HEIGHT: 71 IN | BODY MASS INDEX: 24.08 KG/M2 | DIASTOLIC BLOOD PRESSURE: 72 MMHG | OXYGEN SATURATION: 99 % | SYSTOLIC BLOOD PRESSURE: 132 MMHG | WEIGHT: 172 LBS | HEART RATE: 69 BPM

## 2022-11-07 DIAGNOSIS — I10 ESSENTIAL HYPERTENSION: Chronic | ICD-10-CM

## 2022-11-07 DIAGNOSIS — H40.1130 PRIMARY OPEN ANGLE GLAUCOMA OF BOTH EYES, UNSPECIFIED GLAUCOMA STAGE: Chronic | ICD-10-CM

## 2022-11-07 DIAGNOSIS — I35.0 AORTIC STENOSIS, MILD: Chronic | ICD-10-CM

## 2022-11-07 DIAGNOSIS — E78.2 HYPERLIPIDEMIA, MIXED: Chronic | ICD-10-CM

## 2022-11-07 DIAGNOSIS — Z00.00 GENERAL MEDICAL EXAM: Primary | ICD-10-CM

## 2022-11-07 DIAGNOSIS — E78.2 MIXED HYPERLIPIDEMIA: Chronic | ICD-10-CM

## 2022-11-07 DIAGNOSIS — R01.1 CARDIAC MURMUR: Chronic | ICD-10-CM

## 2022-11-07 DIAGNOSIS — N40.1 BPH WITH OBSTRUCTION/LOWER URINARY TRACT SYMPTOMS: Chronic | ICD-10-CM

## 2022-11-07 DIAGNOSIS — I45.2 BIFASCICULAR BLOCK: Chronic | ICD-10-CM

## 2022-11-07 DIAGNOSIS — I36.1 NONRHEUMATIC TRICUSPID VALVE REGURGITATION: Chronic | ICD-10-CM

## 2022-11-07 DIAGNOSIS — J30.1 SEASONAL ALLERGIC RHINITIS DUE TO POLLEN: Chronic | ICD-10-CM

## 2022-11-07 DIAGNOSIS — Z23 NEED FOR PROPHYLACTIC VACCINATION AGAINST STREPTOCOCCUS PNEUMONIAE (PNEUMOCOCCUS): ICD-10-CM

## 2022-11-07 DIAGNOSIS — N13.8 BPH WITH OBSTRUCTION/LOWER URINARY TRACT SYMPTOMS: Chronic | ICD-10-CM

## 2022-11-07 DIAGNOSIS — I10 HYPERTENSION, ESSENTIAL: Chronic | ICD-10-CM

## 2022-11-07 DIAGNOSIS — E55.9 VITAMIN D DEFICIENCY: Chronic | ICD-10-CM

## 2022-11-07 DIAGNOSIS — E11.9 TYPE 2 DIABETES MELLITUS WITHOUT COMPLICATION, WITHOUT LONG-TERM CURRENT USE OF INSULIN: Chronic | ICD-10-CM

## 2022-11-07 DIAGNOSIS — J45.20 MILD INTERMITTENT ASTHMA WITHOUT COMPLICATION: Chronic | ICD-10-CM

## 2022-11-07 PROCEDURE — 1159F MED LIST DOCD IN RCRD: CPT | Performed by: FAMILY MEDICINE

## 2022-11-07 PROCEDURE — G0009 ADMIN PNEUMOCOCCAL VACCINE: HCPCS | Performed by: FAMILY MEDICINE

## 2022-11-07 PROCEDURE — G0439 PPPS, SUBSEQ VISIT: HCPCS | Performed by: FAMILY MEDICINE

## 2022-11-07 PROCEDURE — 96160 PT-FOCUSED HLTH RISK ASSMT: CPT | Performed by: FAMILY MEDICINE

## 2022-11-07 PROCEDURE — 90677 PCV20 VACCINE IM: CPT | Performed by: FAMILY MEDICINE

## 2022-11-07 PROCEDURE — 99397 PER PM REEVAL EST PAT 65+ YR: CPT | Performed by: FAMILY MEDICINE

## 2022-11-07 PROCEDURE — 1170F FXNL STATUS ASSESSED: CPT | Performed by: FAMILY MEDICINE

## 2022-11-07 NOTE — ASSESSMENT & PLAN NOTE
We reviewed together health maintenance, screening, and vaccination options at his annual wellness visit and physical today.    Updated vaccinations with Prevnar 20.    Encourage COVID booster.    Encouraged advance directive.    Reviewed together fasting labs with good diabetes control with A1c at 6.9.    Continues to follow-up with cardiology given history of murmur    Recheck in 4 to 6 months or sooner if problems arise

## 2022-11-07 NOTE — PROGRESS NOTES
QUICK REFERENCE INFORMATION:  The ABCs of the Annual Wellness Visit    Subsequent Medicare Wellness Visit    @[email or web address]@    HEALTH RISK ASSESSMENT    1945    Recent Hospitalizations:  No hospitalization(s) within the last year..        Current Medical Providers:  Patient Care Team:  Sarkis Fairbanks MD as PCP - General (Family Medicine)  Joseph Armstrong MD as Consulting Physician (Cardiology)        Smoking Status:  Social History     Tobacco Use   Smoking Status Never   Smokeless Tobacco Never       Alcohol Consumption:  Social History     Substance and Sexual Activity   Alcohol Use Not Currently       Depression Screen:   PHQ-2/PHQ-9 Depression Screening 11/7/2022   Little Interest or Pleasure in Doing Things 0-->not at all   Feeling Down, Depressed or Hopeless -   PHQ-9: Brief Depression Severity Measure Score 0       Health Habits and Functional and Cognitive Screening:  Functional & Cognitive Status 11/7/2022   Do you have difficulty preparing food and eating? No   Do you have difficulty bathing yourself, getting dressed or grooming yourself? No   Do you have difficulty using the toilet? No   Do you have difficulty moving around from place to place? No   Do you have trouble with steps or getting out of a bed or a chair? Yes   Current Diet Frequent Junk Food   Dental Exam Up to date   Eye Exam Up to date   Exercise (times per week) 7 times per week   Current Exercises Include Walking;Yard Work;House Cleaning   Do you need help using the phone?  No   Are you deaf or do you have serious difficulty hearing?  No   Do you need help with transportation? No   Do you need help shopping? No   Do you need help preparing meals?  No   Do you need help with housework?  No   Do you need help with laundry? No   Do you need help taking your medications? No   Do you need help managing money? No   Do you ever drive or ride in a car without wearing a seat belt? No   Have you felt unusual stress, anger or loneliness  in the last month? No   Who do you live with? Alone   If you need help, do you have trouble finding someone available to you? No   Have you been bothered in the last four weeks by sexual problems? No   Do you have difficulty concentrating, remembering or making decisions? No       Fall Risk Screen:  FREYA Fall Risk Assessment was completed, and patient is at LOW risk for falls.Assessment completed on:11/7/2022    ACE III MINI        Does the patient have evidence of cognitive impairment? No    Aspirin use counseling: Taking ASA appropriately as indicated    Recent Lab Results:  CMP:  Lab Results   Component Value Date    BUN 14 10/04/2022    CREATININE 0.76 10/04/2022    BCR 18 10/04/2022     (H) 10/04/2022    K 3.6 10/04/2022    CO2 26 10/04/2022    CALCIUM 9.5 10/04/2022    PROTENTOTREF 7.4 10/04/2022    ALBUMIN 5.0 (H) 10/04/2022    LABGLOBREF 2.4 10/04/2022    LABIL2 2.1 10/04/2022    BILITOT 0.5 10/04/2022    ALKPHOS 72 10/04/2022    AST 19 10/04/2022    ALT 23 10/04/2022     HbA1c:  Lab Results   Component Value Date    HGBA1C 6.9 (H) 10/04/2022    HGBA1C 6.5 (H) 04/04/2022     Microalbumin:  Lab Results   Component Value Date    POCMALB 20 04/04/2022     Lipid Panel  Lab Results   Component Value Date    TRIG 157 (H) 10/04/2022    HDL 50 10/04/2022    LDL 69 10/04/2022    AST 19 10/04/2022    ALT 23 10/04/2022       Visual Acuity:  No results found.    Age-appropriate Screening Schedule:  Refer to the list below for future screening recommendations based on patient's age, sex and/or medical conditions. Orders for these recommended tests are listed in the plan section. The patient has been provided with a written plan.    Health Maintenance   Topic Date Due   • DIABETIC EYE EXAM  02/09/2023   • HEMOGLOBIN A1C  04/04/2023   • URINE MICROALBUMIN  04/04/2023   • LIPID PANEL  10/04/2023   • DIABETIC FOOT EXAM  11/07/2023   • TDAP/TD VACCINES (3 - Td or Tdap) 09/02/2029   • INFLUENZA VACCINE  Completed   •  ZOSTER VACCINE  Discontinued        Subjective   History of Present Illness    Mahesh Renee is a 77 y.o. male who presents for a Subsequent Wellness Visit and physical exam.    Doing well after he establish care with me last month.    Willing to get diabetic foot exam done today.    He already completed Shingrix at his pharmacy and will try to get a states for chart update.    Encouraged advance directive and he will consider.    Considering getting COVID booster at his pharmacy.    Willing to get Prevnar 20 today for vaccination update.    History of diabetes, hypertension, hyperlipidemia, asthma on allergy shots, tricuspid regurgitation with chronic heart murmur and bifascicular block, BPH stable on Flomax with ongoing urology follow-up (Dr. Byers) with yearly PSA monitoring, and history of glaucoma with ongoing ophthalmology follow-up and management     Colonoscopy done last in 2016.  He did have 1 small polyp but was told to repeat in 10 years.  Requested report from gastroenterology.  He did have 1 hyperplastic polyp so would not be due for repeat until 2026 if he would even like to continue colonoscopies at that time.  Updated his care gap.     PSA up-to-date with urology.        CHRONIC CONDITIONS    The following portions of the patient's history were reviewed and updated as appropriate: allergies, current medications, past family history, past medical history, past social history, past surgical history and problem list.    Outpatient Medications Prior to Visit   Medication Sig Dispense Refill   • albuterol sulfate  (90 Base) MCG/ACT inhaler Inhale 2 puffs Every 4 (Four) Hours As Needed for Wheezing.     • amLODIPine (NORVASC) 10 MG tablet Take 1 tablet by mouth Daily. 90 tablet 1   • aspirin 81 MG EC tablet aspirin 81 mg tablet,delayed release   Take 1 tablet every day by oral route.     • Cholecalciferol (Vitamin D3) 50 MCG (2000 UT) tablet Take 1 tablet by mouth Daily.     • empagliflozin  (Jardiance) 25 MG tablet tablet Take 1 tablet by mouth Every Morning. 90 tablet 1   • Fluticasone Furoate (FLONASE SENSIMIST NA) fluticasone furoate   50mcg     • glucose blood test strip 1 each by Other route Daily. Used to test blood sugar once a day for non-insulin-dependent diabetes.  Dx E11.9 TRUEMETRIX TEST STRIPS 100 each 3   • lisinopril-hydrochlorothiazide (PRINZIDE,ZESTORETIC) 20-25 MG per tablet Take 1 tablet by mouth Daily. 90 tablet 1   • metFORMIN (GLUCOPHAGE) 500 MG tablet Take 1 tablet by mouth 2 (Two) Times a Day With Meals. 180 tablet 1   • Misc Natural Products (PROSTATE HEALTH PO) Take  by mouth.     • multivitamin (THERAGRAN) tablet tablet Take 1 tablet by mouth Daily.     • rosuvastatin (CRESTOR) 5 MG tablet Take 1 tablet by mouth Daily. 90 tablet 1   • tamsulosin (FLOMAX) 0.4 MG capsule 24 hr capsule Take 1 capsule by mouth Daily.     • Turmeric 450 MG capsule turmeric     • latanoprost (XALATAN) 0.005 % ophthalmic solution Apply 1 drop to eye(s) as directed by provider Daily.       No facility-administered medications prior to visit.       Patient Active Problem List   Diagnosis   • Essential hypertension   • Hyperlipidemia LDL goal <100   • Type 2 diabetes mellitus, without long-term current use of insulin (HCC)   • Aortic stenosis, mild   • Allergic rhinitis   • Benign prostatic hyperplasia with urinary obstruction   • Bifascicular block   • Mild intermittent asthma   • Tricuspid valve regurgitation   • Pulmonary arterial hypertension (HCC)   • Vitamin D deficiency   • Benign essential hypertension   • Mixed hyperlipidemia   • Primary open angle glaucoma (POAG) of both eyes   • Hypertension, essential   • Hyperlipidemia, mixed   • Seasonal allergic rhinitis due to pollen   • BPH with obstruction/lower urinary tract symptoms   • Need for hepatitis C screening test   • Cardiac murmur   • General medical exam   • Need for prophylactic vaccination against Streptococcus pneumoniae (pneumococcus)        Advance Care Planning:  ACP discussion was held with the patient during this visit. Patient has an advance directive (not in EMR), copy requested.    Identification of Risk Factors:  Risk factors include: Advance Directive Discussion  Cardiovascular risk  Fall Risk  Glaucoma Risk  Immunizations Discussed/Encouraged (specific immunizations; Prevnar 20 (Pneumococcal 20-valent conjugate) and COVID19 ).    Review of Systems   Constitutional: Negative for appetite change, chills, fever and unexpected weight change.   HENT: Negative for hearing loss.    Eyes: Negative for visual disturbance.   Respiratory: Negative for chest tightness, shortness of breath and wheezing.    Cardiovascular: Negative for chest pain, palpitations and leg swelling.   Gastrointestinal: Negative for abdominal pain.   Musculoskeletal: Negative for arthralgias, back pain and gait problem.   Skin: Negative for rash.   Neurological: Negative for dizziness and headaches.   Psychiatric/Behavioral: Negative for agitation and confusion. The patient is not nervous/anxious.        Compared to one year ago, the patient feels his physical health is the same.  Compared to one year ago, the patient feels his mental health is the same.    Objective     Physical Exam  Constitutional:       Appearance: Normal appearance.   HENT:      Head: Normocephalic.      Right Ear: External ear normal.      Left Ear: External ear normal.      Nose: Nose normal.   Eyes:      Pupils: Pupils are equal, round, and reactive to light.   Cardiovascular:      Rate and Rhythm: Normal rate and regular rhythm.      Pulses:           Dorsalis pedis pulses are 3+ on the right side and 3+ on the left side.        Posterior tibial pulses are 3+ on the right side and 3+ on the left side.      Heart sounds: Murmur heard.    Systolic murmur is present with a grade of 3/6.    No friction rub. No gallop. No S3 or S4 sounds.   Pulmonary:      Effort: Pulmonary effort is normal.      Breath  "sounds: Normal breath sounds.   Musculoskeletal:         General: Normal range of motion.      Cervical back: Normal range of motion.      Right foot: Normal range of motion. No deformity, bunion or foot drop.      Left foot: Normal range of motion. No deformity, bunion or foot drop.   Feet:      Right foot:      Protective Sensation: 10 sites tested. 10 sites sensed.      Skin integrity: Skin integrity normal.      Toenail Condition: Right toenails are normal.      Left foot:      Protective Sensation: 10 sites tested. 10 sites sensed.      Skin integrity: Skin integrity normal.      Toenail Condition: Left toenails are normal.      Comments: Diabetic Foot Exam Performed and Monofilament Test Performed     Skin:     General: Skin is warm and dry.   Neurological:      General: No focal deficit present.      Mental Status: He is alert.   Psychiatric:         Mood and Affect: Mood normal.         Behavior: Behavior normal.         Thought Content: Thought content normal.          Procedures     Vitals:    11/07/22 0931   BP: 132/72   BP Location: Left arm   Patient Position: Sitting   Cuff Size: Adult   Pulse: 69   SpO2: 99%   Weight: 78 kg (172 lb)   Height: 180.3 cm (71\")       BMI is within normal parameters. No other follow-up for BMI required.      Lab Results   Component Value Date    WBC 5.4 10/04/2022    HGB 16.5 10/04/2022    HCT 50.0 10/04/2022    MCV 89 10/04/2022     10/04/2022     Lab Results   Component Value Date    GLUCOSE 127 (H) 10/04/2022    BUN 14 10/04/2022    CREATININE 0.76 10/04/2022    BCR 18 10/04/2022    K 3.6 10/04/2022    CO2 26 10/04/2022    CALCIUM 9.5 10/04/2022    PROTENTOTREF 7.4 10/04/2022    ALBUMIN 5.0 (H) 10/04/2022    LABIL2 2.1 10/04/2022    AST 19 10/04/2022    ALT 23 10/04/2022     Lab Results   Component Value Date    TSH 3.100 10/04/2022     No results found for: PSA  Lab Results   Component Value Date    CHLPL 146 10/04/2022    TRIG 157 (H) 10/04/2022    HDL 50 " 10/04/2022    LDL 69 10/04/2022       Assessment & Plan   Problem List Items Addressed This Visit        Allergies and Adverse Reactions    Seasonal allergic rhinitis due to pollen (Chronic)    Current Assessment & Plan     Stable with current regimen            Cardiac and Vasculature    Essential hypertension (Chronic)    Current Assessment & Plan     Hypertension is improving with treatment.  Continue current treatment regimen.  Blood pressure will be reassessed at the next regular appointment.         Relevant Medications    amLODIPine (NORVASC) 10 MG tablet    lisinopril-hydrochlorothiazide (PRINZIDE,ZESTORETIC) 20-25 MG per tablet    Aortic stenosis, mild (Chronic)    Current Assessment & Plan     Stable murmur.  No syncope or palpitations or fluttering.  Ongoing follow-up with cardiology         Relevant Medications    amLODIPine (NORVASC) 10 MG tablet    Bifascicular block (Chronic)    Tricuspid valve regurgitation (Chronic)    Relevant Medications    amLODIPine (NORVASC) 10 MG tablet    Mixed hyperlipidemia (Chronic)    Current Assessment & Plan     Lipid abnormalities are improving with treatment.  Pharmacotherapy as ordered.  Lipids will be reassessed in 6 months.         Relevant Medications    rosuvastatin (CRESTOR) 5 MG tablet    Hypertension, essential (Chronic)    Current Assessment & Plan     Hypertension is improving with treatment.  Continue current treatment regimen.  Blood pressure will be reassessed at the next regular appointment.         Relevant Medications    amLODIPine (NORVASC) 10 MG tablet    lisinopril-hydrochlorothiazide (PRINZIDE,ZESTORETIC) 20-25 MG per tablet    Hyperlipidemia, mixed (Chronic)    Current Assessment & Plan     Lipid abnormalities are improving with treatment.  Pharmacotherapy as ordered.  Lipids will be reassessed in 6 months.         Relevant Medications    rosuvastatin (CRESTOR) 5 MG tablet    Cardiac murmur (Chronic)       Endocrine and Metabolic    Type 2  diabetes mellitus, without long-term current use of insulin (HCC) (Chronic)    Current Assessment & Plan     Diabetes is improving with treatment.   Continue current treatment regimen.  Diabetes will be reassessed in 3 months.         Relevant Medications    empagliflozin (Jardiance) 25 MG tablet tablet    lisinopril-hydrochlorothiazide (PRINZIDE,ZESTORETIC) 20-25 MG per tablet    metFORMIN (GLUCOPHAGE) 500 MG tablet    glucose blood test strip    Vitamin D deficiency (Chronic)       Eye    Primary open angle glaucoma (POAG) of both eyes (Chronic)    Relevant Medications    latanoprost (XALATAN) 0.005 % ophthalmic solution       Genitourinary and Reproductive     BPH with obstruction/lower urinary tract symptoms (Chronic)    Current Assessment & Plan     Stable with current regimen         Relevant Medications    tamsulosin (FLOMAX) 0.4 MG capsule 24 hr capsule       Health Encounters    General medical exam - Primary    Current Assessment & Plan     We reviewed together health maintenance, screening, and vaccination options at his annual wellness visit and physical today.    Updated vaccinations with Prevnar 20.    Encourage COVID booster.    Encouraged advance directive.    Reviewed together fasting labs with good diabetes control with A1c at 6.9.    Continues to follow-up with cardiology given history of murmur    Recheck in 4 to 6 months or sooner if problems arise            Infectious Diseases    Need for prophylactic vaccination against Streptococcus pneumoniae (pneumococcus)       Pulmonary and Pneumonias    Mild intermittent asthma (Chronic)    Current Assessment & Plan     Lungs clear.  No wheezing.    Continue current regimen.           Relevant Medications    albuterol sulfate  (90 Base) MCG/ACT inhaler     Patient Self-Management and Personalized Health Advice  The patient has been provided with information about: diet, exercise and weight management and preventive services including:   · Annual  Wellness Visit (AWV)  · Hepatitis C Virus Screening (beneficiaries must fall into one of the following categories to be eligible- high risk for HCV infection, born between [birth year]16[birth year]2, or history of blood transfusion before 1992)  · Pneumococcal Vaccine and Administration.    Outpatient Encounter Medications as of 11/7/2022   Medication Sig Dispense Refill   • albuterol sulfate  (90 Base) MCG/ACT inhaler Inhale 2 puffs Every 4 (Four) Hours As Needed for Wheezing.     • amLODIPine (NORVASC) 10 MG tablet Take 1 tablet by mouth Daily. 90 tablet 1   • aspirin 81 MG EC tablet aspirin 81 mg tablet,delayed release   Take 1 tablet every day by oral route.     • Cholecalciferol (Vitamin D3) 50 MCG (2000 UT) tablet Take 1 tablet by mouth Daily.     • empagliflozin (Jardiance) 25 MG tablet tablet Take 1 tablet by mouth Every Morning. 90 tablet 1   • Fluticasone Furoate (FLONASE SENSIMIST NA) fluticasone furoate   50mcg     • glucose blood test strip 1 each by Other route Daily. Used to test blood sugar once a day for non-insulin-dependent diabetes.  Dx E11.9 TRUEMETRIX TEST STRIPS 100 each 3   • lisinopril-hydrochlorothiazide (PRINZIDE,ZESTORETIC) 20-25 MG per tablet Take 1 tablet by mouth Daily. 90 tablet 1   • metFORMIN (GLUCOPHAGE) 500 MG tablet Take 1 tablet by mouth 2 (Two) Times a Day With Meals. 180 tablet 1   • Misc Natural Products (PROSTATE HEALTH PO) Take  by mouth.     • multivitamin (THERAGRAN) tablet tablet Take 1 tablet by mouth Daily.     • rosuvastatin (CRESTOR) 5 MG tablet Take 1 tablet by mouth Daily. 90 tablet 1   • tamsulosin (FLOMAX) 0.4 MG capsule 24 hr capsule Take 1 capsule by mouth Daily.     • Turmeric 450 MG capsule turmeric     • latanoprost (XALATAN) 0.005 % ophthalmic solution Apply 1 drop to eye(s) as directed by provider Daily.       No facility-administered encounter medications on file as of 11/7/2022.       Reviewed use of high risk medication in the elderly: yes  Reviewed for potential  of harmful drug interactions in the elderly: yes    Diagnoses and all orders for this visit:    1. General medical exam (Primary)  Assessment & Plan:  We reviewed together health maintenance, screening, and vaccination options at his annual wellness visit and physical today.    Updated vaccinations with Prevnar 20.    Encourage COVID booster.    Encouraged advance directive.    Reviewed together fasting labs with good diabetes control with A1c at 6.9.    Continues to follow-up with cardiology given history of murmur    Recheck in 4 to 6 months or sooner if problems arise      2. Need for prophylactic vaccination against Streptococcus pneumoniae (pneumococcus)  -     Pneumococcal Conjugate Vaccine 20-Valent (PCV20)    3. Type 2 diabetes mellitus without complication, without long-term current use of insulin (HCC)  Assessment & Plan:  Diabetes is improving with treatment.   Continue current treatment regimen.  Diabetes will be reassessed in 3 months.      4. Essential hypertension  Assessment & Plan:  Hypertension is improving with treatment.  Continue current treatment regimen.  Blood pressure will be reassessed at the next regular appointment.      5. Mild intermittent asthma without complication  Assessment & Plan:  Lungs clear.  No wheezing.    Continue current regimen.        6. Aortic stenosis, mild  Assessment & Plan:  Stable murmur.  No syncope or palpitations or fluttering.  Ongoing follow-up with cardiology      7. Bifascicular block    8. Nonrheumatic tricuspid valve regurgitation    9. Vitamin D deficiency    10. Mixed hyperlipidemia  Assessment & Plan:  Lipid abnormalities are improving with treatment.  Pharmacotherapy as ordered.  Lipids will be reassessed in 6 months.      11. Primary open angle glaucoma of both eyes, unspecified glaucoma stage    12. Hypertension, essential  Assessment & Plan:  Hypertension is improving with treatment.  Continue current treatment regimen.  Blood pressure will be  reassessed at the next regular appointment.      13. Hyperlipidemia, mixed  Assessment & Plan:  Lipid abnormalities are improving with treatment.  Pharmacotherapy as ordered.  Lipids will be reassessed in 6 months.      14. Seasonal allergic rhinitis due to pollen  Assessment & Plan:  Stable with current regimen      15. BPH with obstruction/lower urinary tract symptoms  Assessment & Plan:  Stable with current regimen      16. Cardiac murmur      Follow Up:  Return in about 4 months (around 3/7/2023) for Med recheck.     There are no Patient Instructions on file for this visit.    An After Visit Summary and PPPS with all of these plans were given to the patient.

## 2023-01-18 ENCOUNTER — CLINICAL SUPPORT (OUTPATIENT)
Dept: FAMILY MEDICINE CLINIC | Facility: CLINIC | Age: 78
End: 2023-01-18
Payer: MEDICARE

## 2023-01-18 DIAGNOSIS — J30.1 SEASONAL ALLERGIC RHINITIS DUE TO POLLEN: Primary | Chronic | ICD-10-CM

## 2023-01-18 PROCEDURE — 95117 IMMUNOTHERAPY INJECTIONS: CPT | Performed by: FAMILY MEDICINE

## 2023-02-15 ENCOUNTER — CLINICAL SUPPORT (OUTPATIENT)
Dept: FAMILY MEDICINE CLINIC | Facility: CLINIC | Age: 78
End: 2023-02-15
Payer: MEDICARE

## 2023-02-15 DIAGNOSIS — J30.1 NON-SEASONAL ALLERGIC RHINITIS DUE TO POLLEN: Primary | ICD-10-CM

## 2023-02-15 PROCEDURE — 95117 IMMUNOTHERAPY INJECTIONS: CPT | Performed by: FAMILY MEDICINE

## 2023-03-07 ENCOUNTER — OFFICE VISIT (OUTPATIENT)
Dept: FAMILY MEDICINE CLINIC | Facility: CLINIC | Age: 78
End: 2023-03-07
Payer: MEDICARE

## 2023-03-07 VITALS
DIASTOLIC BLOOD PRESSURE: 76 MMHG | BODY MASS INDEX: 23.94 KG/M2 | HEART RATE: 66 BPM | HEIGHT: 71 IN | WEIGHT: 171 LBS | SYSTOLIC BLOOD PRESSURE: 132 MMHG | OXYGEN SATURATION: 99 %

## 2023-03-07 DIAGNOSIS — I10 ESSENTIAL HYPERTENSION: Chronic | ICD-10-CM

## 2023-03-07 DIAGNOSIS — J45.20 MILD INTERMITTENT ASTHMA WITHOUT COMPLICATION: Chronic | ICD-10-CM

## 2023-03-07 DIAGNOSIS — R80.9 TYPE 2 DIABETES MELLITUS WITH MICROALBUMINURIA, WITHOUT LONG-TERM CURRENT USE OF INSULIN: Primary | ICD-10-CM

## 2023-03-07 DIAGNOSIS — E78.2 MIXED HYPERLIPIDEMIA: Chronic | ICD-10-CM

## 2023-03-07 DIAGNOSIS — I36.1 NONRHEUMATIC TRICUSPID VALVE REGURGITATION: Chronic | ICD-10-CM

## 2023-03-07 DIAGNOSIS — H40.1130 PRIMARY OPEN ANGLE GLAUCOMA OF BOTH EYES, UNSPECIFIED GLAUCOMA STAGE: Chronic | ICD-10-CM

## 2023-03-07 DIAGNOSIS — E11.29 TYPE 2 DIABETES MELLITUS WITH MICROALBUMINURIA, WITHOUT LONG-TERM CURRENT USE OF INSULIN: Primary | ICD-10-CM

## 2023-03-07 DIAGNOSIS — I35.0 AORTIC STENOSIS, MILD: Chronic | ICD-10-CM

## 2023-03-07 PROBLEM — E78.5 HYPERLIPIDEMIA LDL GOAL <100: Status: RESOLVED | Noted: 2021-05-06 | Resolved: 2023-03-07

## 2023-03-07 PROBLEM — Z23 NEED FOR PROPHYLACTIC VACCINATION AGAINST STREPTOCOCCUS PNEUMONIAE (PNEUMOCOCCUS): Status: RESOLVED | Noted: 2022-11-07 | Resolved: 2023-03-07

## 2023-03-07 PROBLEM — I27.21 PULMONARY ARTERIAL HYPERTENSION: Status: RESOLVED | Noted: 2018-04-05 | Resolved: 2023-03-07

## 2023-03-07 PROBLEM — Z11.59 NEED FOR HEPATITIS C SCREENING TEST: Status: RESOLVED | Noted: 2022-10-04 | Resolved: 2023-03-07

## 2023-03-07 PROBLEM — E11.9 TYPE 2 DIABETES MELLITUS, WITHOUT LONG-TERM CURRENT USE OF INSULIN: Chronic | Status: RESOLVED | Noted: 2021-05-06 | Resolved: 2023-03-07

## 2023-03-07 PROBLEM — N13.8 BENIGN PROSTATIC HYPERPLASIA WITH URINARY OBSTRUCTION: Status: RESOLVED | Noted: 2019-10-24 | Resolved: 2023-03-07

## 2023-03-07 PROBLEM — J30.9 ALLERGIC RHINITIS: Status: RESOLVED | Noted: 2017-10-05 | Resolved: 2023-03-07

## 2023-03-07 PROBLEM — N40.1 BENIGN PROSTATIC HYPERPLASIA WITH URINARY OBSTRUCTION: Status: RESOLVED | Noted: 2019-10-24 | Resolved: 2023-03-07

## 2023-03-07 PROCEDURE — 99214 OFFICE O/P EST MOD 30 MIN: CPT | Performed by: FAMILY MEDICINE

## 2023-03-07 PROCEDURE — 36415 COLL VENOUS BLD VENIPUNCTURE: CPT | Performed by: FAMILY MEDICINE

## 2023-03-07 RX ORDER — AMLODIPINE BESYLATE 10 MG/1
10 TABLET ORAL DAILY
Qty: 90 TABLET | Refills: 1 | Status: SHIPPED | OUTPATIENT
Start: 2023-03-07

## 2023-03-07 RX ORDER — LISINOPRIL 20 MG/1
20 TABLET ORAL DAILY
Qty: 90 TABLET | Refills: 1 | Status: SHIPPED | OUTPATIENT
Start: 2023-03-07

## 2023-03-07 RX ORDER — ROSUVASTATIN CALCIUM 5 MG/1
5 TABLET, COATED ORAL DAILY
Qty: 90 TABLET | Refills: 1 | Status: SHIPPED | OUTPATIENT
Start: 2023-03-07

## 2023-03-07 NOTE — ASSESSMENT & PLAN NOTE
Discussed low home blood pressure readings with fatigue.    We will change from lisinopril HCTZ to plain lisinopril.  Discussed if he continues to have lower blood pressure readings with symptoms to cut down to 5 mg of his amlodipine.    Reviewed and refilled meds.    Given written instructions about blood pressure management as described above.    Awaiting fasting labs.

## 2023-03-07 NOTE — PROGRESS NOTES
"Chief Complaint  Hypertension    Subjective    History of Present Illness:  Mahesh Renee is a 77 y.o. male who presents today for follow-up visit medication refills.    He has been experiencing some episodes of fatigue and dizziness with low blood pressure readings on his home monitor.  Some of his readings have been in the 120s over 50s.  He does occasionally skip his amlodipine and/or lisinopril HCTZ when he is feeling bad with low blood pressures at home.    History of diabetes, hypertension, hyperlipidemia, asthma on allergy shots, tricuspid regurgitation with chronic heart murmur and bifascicular block, BPH stable on Flomax with ongoing urology follow-up (Dr. Byers) with yearly PSA monitoring, and history of glaucoma with ongoing ophthalmology follow-up and management     Colonoscopy done last in 2016.  He did have 1 small polyp but was told to repeat in 10 years.  Obtained colon polyp pathology report that did return consistent with hyperplastic polyp.  Discussed he is not due for repeat until 2026 - if he would even like to continue colonoscopies at that time.      PSA up-to-date with urology.    Objective   Vital Signs:   /76 (BP Location: Left arm, Patient Position: Sitting, Cuff Size: Adult)   Pulse 66   Ht 180.3 cm (71\")   Wt 77.6 kg (171 lb)   SpO2 99%   BMI 23.85 kg/m²     Review of Systems   Constitutional: Positive for fatigue. Negative for appetite change, chills and fever.   HENT: Negative for hearing loss.    Eyes: Positive for visual disturbance. Negative for blurred vision.   Respiratory: Negative for chest tightness.    Cardiovascular: Negative for chest pain.   Gastrointestinal: Negative for abdominal pain.   Musculoskeletal: Negative for gait problem.   Skin: Negative for rash.   Psychiatric/Behavioral: Negative for depressed mood.       Past History:  Medical History: has a past medical history of Acute allergic rhinitis, Benign essential hypertension, Benign prostatic " hyperplasia with urinary obstruction, Bifascicular block, BMI 23.0-23.9, adult, Cholecystitis, Chronic allergic rhinitis, Diabetes mellitus without complication (HCC), Erectile dysfunction, Extrinsic asthma, High risk medication use, Low back pain, Mild intermittent asthma without complication, Mixed hyperlipidemia, Nocturia, Nonrheumatic tricuspid valve regurgitation, Obstructive sleep apnea syndrome, Other obstructive and reflux uropathy, Secondary pulmonary arterial hypertension (HCC), Tricuspid valve insufficiency, and Vitamin D deficiency.   Surgical History: has a past surgical history that includes Other surgical history; Cholecystectomy; and Inguinal hernia repair.   Family History: family history includes Arthritis in his father and mother; Breast cancer in his mother and sister; Coronary artery disease in his father; Diabetes in his sister; Hyperlipidemia in his father, mother, and sister; Hypertension in his father, mother, and sister; Other in his father and mother; Stroke in his mother.   Social History: reports that he has never smoked. He has never used smokeless tobacco. He reports that he does not currently use alcohol. He reports that he does not use drugs.      Current Outpatient Medications:   •  albuterol sulfate  (90 Base) MCG/ACT inhaler, Inhale 2 puffs Every 4 (Four) Hours As Needed for Wheezing., Disp: , Rfl:   •  amLODIPine (NORVASC) 10 MG tablet, Take 1 tablet by mouth Daily., Disp: 90 tablet, Rfl: 1  •  aspirin 81 MG EC tablet, aspirin 81 mg tablet,delayed release  Take 1 tablet every day by oral route., Disp: , Rfl:   •  Cholecalciferol (Vitamin D3) 50 MCG (2000 UT) tablet, Take 1 tablet by mouth Daily., Disp: , Rfl:   •  empagliflozin (Jardiance) 25 MG tablet tablet, Take 1 tablet by mouth Every Morning., Disp: 90 tablet, Rfl: 1  •  Fluticasone Furoate (FLONASE SENSIMIST NA), fluticasone furoate  50mcg, Disp: , Rfl:   •  glucose blood test strip, 1 each by Other route Daily. Used  to test blood sugar once a day for non-insulin-dependent diabetes.  Dx E11.9 TRUEMETRIX TEST STRIPS, Disp: 100 each, Rfl: 3  •  latanoprost (XALATAN) 0.005 % ophthalmic solution, Apply 1 drop to eye(s) as directed by provider Daily., Disp: , Rfl:   •  metFORMIN (GLUCOPHAGE) 500 MG tablet, Take 1 tablet by mouth 2 (Two) Times a Day With Meals., Disp: 180 tablet, Rfl: 1  •  Misc Natural Products (PROSTATE HEALTH PO), Take  by mouth., Disp: , Rfl:   •  multivitamin (THERAGRAN) tablet tablet, Take 1 tablet by mouth Daily., Disp: , Rfl:   •  rosuvastatin (CRESTOR) 5 MG tablet, Take 1 tablet by mouth Daily., Disp: 90 tablet, Rfl: 1  •  tamsulosin (FLOMAX) 0.4 MG capsule 24 hr capsule, Take 1 capsule by mouth Daily., Disp: , Rfl:   •  Turmeric 450 MG capsule, turmeric, Disp: , Rfl:   •  lisinopril (PRINIVIL,ZESTRIL) 20 MG tablet, Take 1 tablet by mouth Daily. (this replaces lisinopril/hctz), Disp: 90 tablet, Rfl: 1    Allergies: Patient has no known allergies.    Physical Exam  Constitutional:       Appearance: Normal appearance.   HENT:      Head: Normocephalic.      Right Ear: External ear normal.      Left Ear: External ear normal.      Nose: Nose normal.   Eyes:      Pupils: Pupils are equal, round, and reactive to light.   Cardiovascular:      Rate and Rhythm: Normal rate and regular rhythm.      Heart sounds: Murmur heard.      Comments: Unchanged murmur  Pulmonary:      Effort: Pulmonary effort is normal.      Breath sounds: Normal breath sounds.   Musculoskeletal:         General: Normal range of motion.      Cervical back: Normal range of motion.   Skin:     General: Skin is warm and dry.   Neurological:      General: No focal deficit present.      Mental Status: He is alert.   Psychiatric:         Mood and Affect: Mood normal.         Behavior: Behavior normal.         Thought Content: Thought content normal.          Result Review                   Assessment and Plan  Diagnoses and all orders for this  visit:    1. Type 2 diabetes mellitus with microalbuminuria, without long-term current use of insulin (HCC) (Primary)  Assessment & Plan:  Diabetes is improving with treatment.   Continue current treatment regimen.  Diabetes will be reassessed 4 months.    Orders:  -     lisinopril (PRINIVIL,ZESTRIL) 20 MG tablet; Take 1 tablet by mouth Daily. (this replaces lisinopril/hctz)  Dispense: 90 tablet; Refill: 1  -     empagliflozin (Jardiance) 25 MG tablet tablet; Take 1 tablet by mouth Every Morning.  Dispense: 90 tablet; Refill: 1  -     metFORMIN (GLUCOPHAGE) 500 MG tablet; Take 1 tablet by mouth 2 (Two) Times a Day With Meals.  Dispense: 180 tablet; Refill: 1  -     rosuvastatin (CRESTOR) 5 MG tablet; Take 1 tablet by mouth Daily.  Dispense: 90 tablet; Refill: 1  -     Comprehensive Metabolic Panel; Future  -     Lipid Panel; Future  -     Hemoglobin A1c; Future  -     Comprehensive Metabolic Panel  -     Lipid Panel  -     Hemoglobin A1c    2. Essential hypertension  Assessment & Plan:  Discussed low home blood pressure readings with fatigue.    We will change from lisinopril HCTZ to plain lisinopril.  Discussed if he continues to have lower blood pressure readings with symptoms to cut down to 5 mg of his amlodipine.    Reviewed and refilled meds.    Given written instructions about blood pressure management as described above.    Awaiting fasting labs.    Orders:  -     lisinopril (PRINIVIL,ZESTRIL) 20 MG tablet; Take 1 tablet by mouth Daily. (this replaces lisinopril/hctz)  Dispense: 90 tablet; Refill: 1  -     amLODIPine (NORVASC) 10 MG tablet; Take 1 tablet by mouth Daily.  Dispense: 90 tablet; Refill: 1  -     Comprehensive Metabolic Panel; Future  -     Lipid Panel; Future  -     Comprehensive Metabolic Panel  -     Lipid Panel    3. Mild intermittent asthma without complication  Assessment & Plan:  Continues to do well with current regimen.  Ongoing follow-up with his allergist          4. Aortic stenosis,  mild  Assessment & Plan:  Stable murmur.  Asymptomatic.  Cardiology following      5. Nonrheumatic tricuspid valve regurgitation  Assessment & Plan:  Stable murmur.  Asymptomatic.  Cardiology following      6. Mixed hyperlipidemia  Assessment & Plan:  Lipid abnormalities are improving with treatment.  Pharmacotherapy as ordered.  Lipids will be reassessed 4 months.    Orders:  -     rosuvastatin (CRESTOR) 5 MG tablet; Take 1 tablet by mouth Daily.  Dispense: 90 tablet; Refill: 1  -     Comprehensive Metabolic Panel; Future  -     Lipid Panel; Future  -     Comprehensive Metabolic Panel  -     Lipid Panel    7. Primary open angle glaucoma of both eyes, unspecified glaucoma stage  Assessment & Plan:  Ongoing follow-up and management with ophthalmology        BMI is within normal parameters. No other follow-up for BMI required.          Follow Up  Return in about 4 months (around 7/7/2023) for Med recheck, Fasting for labs at appointment (but drink water!).    Sarkis Fairbanks MD

## 2023-03-08 LAB
ALBUMIN SERPL-MCNC: 4.2 G/DL (ref 3.7–4.7)
ALBUMIN/GLOB SERPL: 1.9 {RATIO} (ref 1.2–2.2)
ALP SERPL-CCNC: 75 IU/L (ref 44–121)
ALT SERPL-CCNC: 26 IU/L (ref 0–44)
AST SERPL-CCNC: 26 IU/L (ref 0–40)
BILIRUB SERPL-MCNC: 0.4 MG/DL (ref 0–1.2)
BUN SERPL-MCNC: 19 MG/DL (ref 8–27)
BUN/CREAT SERPL: 28 (ref 10–24)
CALCIUM SERPL-MCNC: 9.1 MG/DL (ref 8.6–10.2)
CHLORIDE SERPL-SCNC: 100 MMOL/L (ref 96–106)
CHOLEST SERPL-MCNC: 130 MG/DL (ref 100–199)
CO2 SERPL-SCNC: 22 MMOL/L (ref 20–29)
CREAT SERPL-MCNC: 0.69 MG/DL (ref 0.76–1.27)
EGFRCR SERPLBLD CKD-EPI 2021: 97 ML/MIN/1.73
GLOBULIN SER CALC-MCNC: 2.2 G/DL (ref 1.5–4.5)
GLUCOSE SERPL-MCNC: 133 MG/DL (ref 70–99)
HBA1C MFR BLD: 7.1 % (ref 4.8–5.6)
HDLC SERPL-MCNC: 42 MG/DL
LDLC SERPL CALC-MCNC: 63 MG/DL (ref 0–99)
POTASSIUM SERPL-SCNC: 4.7 MMOL/L (ref 3.5–5.2)
PROT SERPL-MCNC: 6.4 G/DL (ref 6–8.5)
SODIUM SERPL-SCNC: 142 MMOL/L (ref 134–144)
TRIGL SERPL-MCNC: 143 MG/DL (ref 0–149)
VLDLC SERPL CALC-MCNC: 25 MG/DL (ref 5–40)

## 2023-03-15 ENCOUNTER — CLINICAL SUPPORT (OUTPATIENT)
Dept: FAMILY MEDICINE CLINIC | Facility: CLINIC | Age: 78
End: 2023-03-15
Payer: MEDICARE

## 2023-03-15 DIAGNOSIS — R80.9 TYPE 2 DIABETES MELLITUS WITH MICROALBUMINURIA, WITHOUT LONG-TERM CURRENT USE OF INSULIN: Primary | Chronic | ICD-10-CM

## 2023-03-15 DIAGNOSIS — E11.29 TYPE 2 DIABETES MELLITUS WITH MICROALBUMINURIA, WITHOUT LONG-TERM CURRENT USE OF INSULIN: Primary | Chronic | ICD-10-CM

## 2023-03-15 PROCEDURE — 95117 IMMUNOTHERAPY INJECTIONS: CPT | Performed by: FAMILY MEDICINE

## 2023-04-12 ENCOUNTER — CLINICAL SUPPORT (OUTPATIENT)
Dept: FAMILY MEDICINE CLINIC | Facility: CLINIC | Age: 78
End: 2023-04-12
Payer: MEDICARE

## 2023-04-12 DIAGNOSIS — J30.1 SEASONAL ALLERGIC RHINITIS DUE TO POLLEN: Chronic | ICD-10-CM

## 2023-05-10 ENCOUNTER — CLINICAL SUPPORT (OUTPATIENT)
Dept: FAMILY MEDICINE CLINIC | Facility: CLINIC | Age: 78
End: 2023-05-10
Payer: MEDICARE

## 2023-05-10 DIAGNOSIS — J30.1 SEASONAL ALLERGIC RHINITIS DUE TO POLLEN: Primary | Chronic | ICD-10-CM

## 2023-05-26 ENCOUNTER — OFFICE VISIT (OUTPATIENT)
Dept: CARDIOLOGY | Facility: CLINIC | Age: 78
End: 2023-05-26
Payer: MEDICARE

## 2023-05-26 VITALS
RESPIRATION RATE: 18 BRPM | HEIGHT: 71 IN | SYSTOLIC BLOOD PRESSURE: 136 MMHG | BODY MASS INDEX: 23.24 KG/M2 | OXYGEN SATURATION: 98 % | DIASTOLIC BLOOD PRESSURE: 66 MMHG | WEIGHT: 166 LBS | HEART RATE: 59 BPM

## 2023-05-26 DIAGNOSIS — R80.9 TYPE 2 DIABETES MELLITUS WITH MICROALBUMINURIA, WITHOUT LONG-TERM CURRENT USE OF INSULIN: Chronic | ICD-10-CM

## 2023-05-26 DIAGNOSIS — I35.0 AORTIC STENOSIS, MILD: Primary | Chronic | ICD-10-CM

## 2023-05-26 DIAGNOSIS — E78.2 MIXED HYPERLIPIDEMIA: Chronic | ICD-10-CM

## 2023-05-26 DIAGNOSIS — I45.2 BIFASCICULAR BLOCK: Chronic | ICD-10-CM

## 2023-05-26 DIAGNOSIS — I10 ESSENTIAL HYPERTENSION: Chronic | ICD-10-CM

## 2023-05-26 DIAGNOSIS — E11.29 TYPE 2 DIABETES MELLITUS WITH MICROALBUMINURIA, WITHOUT LONG-TERM CURRENT USE OF INSULIN: Chronic | ICD-10-CM

## 2023-05-26 PROBLEM — R01.1 CARDIAC MURMUR: Chronic | Status: RESOLVED | Noted: 2022-10-04 | Resolved: 2023-05-26

## 2023-05-26 PROCEDURE — 1160F RVW MEDS BY RX/DR IN RCRD: CPT | Performed by: INTERNAL MEDICINE

## 2023-05-26 PROCEDURE — 99214 OFFICE O/P EST MOD 30 MIN: CPT | Performed by: INTERNAL MEDICINE

## 2023-05-26 PROCEDURE — 1159F MED LIST DOCD IN RCRD: CPT | Performed by: INTERNAL MEDICINE

## 2023-05-26 PROCEDURE — 3078F DIAST BP <80 MM HG: CPT | Performed by: INTERNAL MEDICINE

## 2023-05-26 PROCEDURE — 3075F SYST BP GE 130 - 139MM HG: CPT | Performed by: INTERNAL MEDICINE

## 2023-05-26 PROCEDURE — 93000 ELECTROCARDIOGRAM COMPLETE: CPT | Performed by: INTERNAL MEDICINE

## 2023-05-26 RX ORDER — LISINOPRIL 40 MG/1
40 TABLET ORAL DAILY
Qty: 90 TABLET | Refills: 3 | Status: SHIPPED | OUTPATIENT
Start: 2023-05-26

## 2023-05-26 RX ORDER — FINASTERIDE 5 MG/1
5 TABLET, FILM COATED ORAL DAILY
COMMUNITY

## 2023-05-26 RX ORDER — AMLODIPINE BESYLATE 5 MG/1
5 TABLET ORAL DAILY
Qty: 90 TABLET | Refills: 3 | Status: SHIPPED | OUTPATIENT
Start: 2023-05-26

## 2023-05-26 NOTE — PROGRESS NOTES
[facility]  [facility]  [address] [place] [address]  Dept: [phone]  Dept Fax: [phone]    Mahesh Renee  1945    Follow Up Office Visit Note    History of Present Illness:  Mahesh Renee is a 77 y.o. male who presents to the clinic for Follow-up. Aortic stenosis - He seems asymptomatic, denies any CP, SOB, palpitations, dizziness, syncope, he did have the ehco with no significant changes Aortic vleocity 3,0 and mean gradient 22, MARSHA 1,2 cm, will repeat echo in 1 year    The following portions of the patient's history were reviewed and updated as appropriate: allergies, current medications, past family history, past medical history, past social history, past surgical history, and problem list.    Medications:  albuterol sulfate HFA  Allergy Serum Injection  amLODIPine  aspirin  empagliflozin tablet  finasteride  FLONASE SENSIMIST NA  glucose blood  latanoprost  lisinopril  metFORMIN  multivitamin tablet  PROSTATE HEALTH PO  rosuvastatin  tamsulosin capsule  Turmeric capsule  Vitamin D3 tablet    Subjective  No Known Allergies     Past Medical History:   Diagnosis Date   • Acute allergic rhinitis    • Benign essential hypertension    • Benign prostatic hyperplasia with urinary obstruction    • Bifascicular block    • BMI 23.0-23.9, adult    • Cholecystitis    • Chronic allergic rhinitis    • Diabetes mellitus without complication    • Erectile dysfunction    • Extrinsic asthma    • High risk medication use    • Low back pain    • Mild intermittent asthma without complication    • Mixed hyperlipidemia    • Nocturia    • Nonrheumatic tricuspid valve regurgitation    • Obstructive sleep apnea syndrome    • Other obstructive and reflux uropathy    • Secondary pulmonary arterial hypertension    • Tricuspid valve insufficiency    • Vitamin D deficiency        Past Surgical History:   Procedure Laterality Date   • CHOLECYSTECTOMY     • INGUINAL HERNIA REPAIR  "    • OTHER SURGICAL HISTORY      UVULA SURGER       Family History   Problem Relation Age of Onset   • Stroke Mother    • Other Mother         CAROTID OR VASCULAR DISEASE   • Hypertension Mother    • Hyperlipidemia Mother    • Arthritis Mother    • Breast cancer Mother    • Hypertension Father    • Arthritis Father    • Hyperlipidemia Father    • Other Father         CAROTID OR VASCULAR DISEASE   • Coronary artery disease Father    • Breast cancer Sister    • Hypertension Sister    • Diabetes Sister    • Hyperlipidemia Sister         Social History     Socioeconomic History   • Marital status:    Tobacco Use   • Smoking status: Never   • Smokeless tobacco: Never   Vaping Use   • Vaping Use: Never used   Substance and Sexual Activity   • Alcohol use: Not Currently   • Drug use: Never   • Sexual activity: Defer       Review of Systems   Constitutional: Negative.    HENT: Negative.    Respiratory: Negative.    Cardiovascular: Negative.    Endocrine: Negative.    Genitourinary: Negative.    Musculoskeletal: Negative.    Skin: Negative.    Allergic/Immunologic: Negative.    Neurological: Negative.    Hematological: Negative.    Psychiatric/Behavioral: Negative.        Cardiovascular Procedures    ECHO/MUGA:  STRESS TESTS:   CARDIAC CATH:   DEVICES:   HOLTER:   CT/MRI:   VASCULAR:   CARDIOTHORACIC:     Objective  Vitals:    05/26/23 0911   BP: 136/66   BP Location: Right arm   Patient Position: Lying   Cuff Size: Adult   Pulse: 59   Resp: 18   SpO2: 98%   Weight: 75.3 kg (166 lb)   Height: 180.3 cm (71\")   PainSc: 0-No pain     Body mass index is 23.15 kg/m².     Physical Exam  Constitutional:       Appearance: Healthy appearance. Not in distress.   Neck:      Vascular: No JVR. JVD normal.   Pulmonary:      Effort: Pulmonary effort is normal.      Breath sounds: Normal breath sounds. No wheezing. No rhonchi. No rales.   Chest:      Chest wall: Not tender to palpatation.   Cardiovascular:      PMI at left " midclavicular line. Normal rate. Regular rhythm. Normal S1. Normal S2.      Murmurs: There is a harsh midsystolic murmur at the URSB, radiating to the neck.      No gallop. No click. No rub.   Pulses:     Intact distal pulses.   Edema:     Peripheral edema absent.   Abdominal:      General: Bowel sounds are normal.      Palpations: Abdomen is soft.      Tenderness: There is no abdominal tenderness.   Musculoskeletal: Normal range of motion.         General: No tenderness. Skin:     General: Skin is warm and dry.   Neurological:      General: No focal deficit present.      Mental Status: Alert and oriented to person, place and time.          Diagnostic Data    ECG 12 Lead    Date/Time: 5/26/2023 9:56 AM  Performed by: Joseph Armstrong MD  Authorized by: Joseph Armstrong MD   Comparison: compared with previous ECG from 5/27/2022  Similar to previous ECG  Rhythm: sinus rhythm  Rate: normal  BPM: 57  Conduction: right bundle branch block and left anterior fascicular block  QRS axis: normal    Clinical impression: abnormal EKG            Assessment and Plan  Diagnoses and all orders for this visit:    Aortic stenosis, mild- Moderate now 1.,2 cm, asymptomatic, will observe  -     Cancel: Adult Transthoracic Echo Complete W/ Cont if Necessary Per Protocol; Future  -     Adult Transthoracic Echo Complete W/ Cont if Necessary Per Protocol; Future    Bifascicular block- No changes, HR 63. asymptomatic    Essential hypertension- BP is 140.85 has mild edema, will lower Amlodipine to 5 mg and will increase Lisinopril to 40 mg, might need a 3 meds  -     amLODIPine (NORVASC) 5 MG tablet; Take 1 tablet by mouth Daily.  -     lisinopril (PRINIVIL,ZESTRIL) 40 MG tablet; Take 1 tablet by mouth Daily. (this replaces lisinopril/hctz)    Mixed hyperlipidemia- On Crestor 5mg LDL is 63    Type 2 diabetes mellitus with microalbuminuria, without long-term current use of insulin  -     lisinopril (PRINIVIL,ZESTRIL) 40 MG tablet;  Take 1 tablet by mouth Daily. (this replaces lisinopril/hctz)    Other orders  -     finasteride (PROSCAR) 5 MG tablet; Take 1 tablet by mouth Daily.         Return in about 1 year (around 5/26/2024) for Recheck with Dr. Armstrong.    Joseph Armstrong MD  05/26/2023

## 2023-09-26 ENCOUNTER — CLINICAL SUPPORT (OUTPATIENT)
Dept: FAMILY MEDICINE CLINIC | Facility: CLINIC | Age: 78
End: 2023-09-26
Payer: MEDICARE

## 2023-09-26 DIAGNOSIS — J30.1 SEASONAL ALLERGIC RHINITIS DUE TO POLLEN: Primary | Chronic | ICD-10-CM

## 2023-11-09 ENCOUNTER — OFFICE VISIT (OUTPATIENT)
Dept: FAMILY MEDICINE CLINIC | Facility: CLINIC | Age: 78
End: 2023-11-09
Payer: MEDICARE

## 2023-11-09 VITALS
WEIGHT: 169.5 LBS | DIASTOLIC BLOOD PRESSURE: 76 MMHG | HEIGHT: 71 IN | BODY MASS INDEX: 23.73 KG/M2 | OXYGEN SATURATION: 98 % | HEART RATE: 64 BPM | SYSTOLIC BLOOD PRESSURE: 130 MMHG | TEMPERATURE: 96.9 F

## 2023-11-09 DIAGNOSIS — E78.2 MIXED HYPERLIPIDEMIA: Chronic | ICD-10-CM

## 2023-11-09 DIAGNOSIS — H40.1130 PRIMARY OPEN ANGLE GLAUCOMA OF BOTH EYES, UNSPECIFIED GLAUCOMA STAGE: Chronic | ICD-10-CM

## 2023-11-09 DIAGNOSIS — J45.20 MILD INTERMITTENT ASTHMA WITHOUT COMPLICATION: Chronic | ICD-10-CM

## 2023-11-09 DIAGNOSIS — R80.9 TYPE 2 DIABETES MELLITUS WITH MICROALBUMINURIA, WITHOUT LONG-TERM CURRENT USE OF INSULIN: Chronic | ICD-10-CM

## 2023-11-09 DIAGNOSIS — I10 ESSENTIAL HYPERTENSION: Chronic | ICD-10-CM

## 2023-11-09 DIAGNOSIS — J30.1 SEASONAL ALLERGIC RHINITIS DUE TO POLLEN: Chronic | ICD-10-CM

## 2023-11-09 DIAGNOSIS — I35.0 AORTIC STENOSIS, MILD: Chronic | ICD-10-CM

## 2023-11-09 DIAGNOSIS — N13.8 BPH WITH OBSTRUCTION/LOWER URINARY TRACT SYMPTOMS: Chronic | ICD-10-CM

## 2023-11-09 DIAGNOSIS — E11.29 TYPE 2 DIABETES MELLITUS WITH MICROALBUMINURIA, WITHOUT LONG-TERM CURRENT USE OF INSULIN: Chronic | ICD-10-CM

## 2023-11-09 DIAGNOSIS — I36.1 NONRHEUMATIC TRICUSPID VALVE REGURGITATION: Chronic | ICD-10-CM

## 2023-11-09 DIAGNOSIS — I45.2 BIFASCICULAR BLOCK: Chronic | ICD-10-CM

## 2023-11-09 DIAGNOSIS — Z00.00 GENERAL MEDICAL EXAM: Primary | ICD-10-CM

## 2023-11-09 DIAGNOSIS — N40.1 BPH WITH OBSTRUCTION/LOWER URINARY TRACT SYMPTOMS: Chronic | ICD-10-CM

## 2023-11-09 DIAGNOSIS — E55.9 VITAMIN D DEFICIENCY: Chronic | ICD-10-CM

## 2023-11-09 RX ORDER — ROSUVASTATIN CALCIUM 5 MG/1
5 TABLET, COATED ORAL DAILY
Qty: 90 TABLET | Refills: 1 | Status: SHIPPED | OUTPATIENT
Start: 2023-11-09

## 2023-11-09 RX ORDER — METFORMIN HYDROCHLORIDE 500 MG/1
1000 TABLET, EXTENDED RELEASE ORAL
Qty: 180 TABLET | Refills: 1 | Status: SHIPPED | OUTPATIENT
Start: 2023-11-09

## 2023-11-09 NOTE — ASSESSMENT & PLAN NOTE
Asthma is improving with treatment.  The patient is experiencing no daytime asthma symptoms. He is experiencing no nighttime asthma symptoms.  Continue current regimen

## 2023-11-09 NOTE — ASSESSMENT & PLAN NOTE
Stable control with current regimen.      Continues with proscar and flomax.     PSA monitoring ongoing with urology at this time.

## 2023-11-09 NOTE — ASSESSMENT & PLAN NOTE
Continued follow-up and management with ophthalmology.  Medication changes reviewed and updated from ophthalmology since our last visit.

## 2023-11-09 NOTE — PROGRESS NOTES
QUICK REFERENCE INFORMATION:  The ABCs of the Annual Wellness Visit    Subsequent Medicare Wellness Visit    @[email or web address]@    HEALTH RISK ASSESSMENT    1945    Recent Hospitalizations:  No hospitalization(s) within the last year..        Current Medical Providers:  Patient Care Team:  Sarkis Fairbanks MD as PCP - General (Family Medicine)  Joseph Armstrong MD as Consulting Physician (Cardiology)        Smoking Status:  Social History     Tobacco Use   Smoking Status Never   Smokeless Tobacco Never       Alcohol Consumption:  Social History     Substance and Sexual Activity   Alcohol Use Not Currently       Depression Screen:       3/7/2023     8:27 AM   PHQ-2/PHQ-9 Depression Screening   Little Interest or Pleasure in Doing Things 0-->not at all   Feeling Down, Depressed or Hopeless 0-->not at all   PHQ-9: Brief Depression Severity Measure Score 0       Health Habits and Functional and Cognitive Screenin/9/2023     8:00 AM   Functional & Cognitive Status   Do you have difficulty preparing food and eating? No   Do you have difficulty bathing yourself, getting dressed or grooming yourself? No   Do you have difficulty using the toilet? No   Do you have difficulty moving around from place to place? No   Do you have trouble with steps or getting out of a bed or a chair? No   Current Diet Low Carb Diet   Dental Exam Up to date   Eye Exam Up to date   Exercise (times per week) 5 times per week   Current Exercises Include Yard Work   Do you need help using the phone?  No   Are you deaf or do you have serious difficulty hearing?  No   Do you need help to go to places out of walking distance? No   Do you need help shopping? No   Do you need help preparing meals?  No   Do you need help with housework?  No   Do you need help with laundry? No   Do you need help taking your medications? No   Do you need help managing money? No   Do you ever drive or ride in a car without wearing a seat belt? No   Have you  felt unusual stress, anger or loneliness in the last month? No   Who do you live with? Alone   If you need help, do you have trouble finding someone available to you? No   Have you been bothered in the last four weeks by sexual problems? No   Do you have difficulty concentrating, remembering or making decisions? No       Fall Risk Screen:  FREYA Fall Risk Assessment was completed, and patient is at LOW risk for falls.Assessment completed on:3/7/2023    ACE III MINI        Does the patient have evidence of cognitive impairment? No    Aspirin use counseling: Taking ASA appropriately as indicated    Recent Lab Results:  CMP:  Lab Results   Component Value Date    BUN 16 07/11/2023    CREATININE 0.67 (L) 07/11/2023    BCR 24 07/11/2023     07/11/2023    K TNP 07/11/2023    CO2 23 07/11/2023    CALCIUM 9.6 07/11/2023    PROTENTOTREF 6.8 07/11/2023    ALBUMIN 4.6 07/11/2023    LABGLOBREF 2.2 07/11/2023    LABIL2 2.1 07/11/2023    BILITOT 0.7 07/11/2023    ALKPHOS 74 07/11/2023    AST 17 07/11/2023    ALT 18 07/11/2023     HbA1c:  Lab Results   Component Value Date    HGBA1C 6.9 (H) 07/11/2023    HGBA1C 7.1 (H) 03/07/2023     Microalbumin:  Lab Results   Component Value Date    POCMALB 20 07/11/2023     Lipid Panel  Lab Results   Component Value Date    TRIG 115 07/11/2023    HDL 47 07/11/2023    LDL 58 07/11/2023    AST 17 07/11/2023    ALT 18 07/11/2023       Visual Acuity:  No results found.    Age-appropriate Screening Schedule:  Refer to the list below for future screening recommendations based on patient's age, sex and/or medical conditions. Orders for these recommended tests are listed in the plan section. The patient has been provided with a written plan.    Health Maintenance   Topic Date Due    HEMOGLOBIN A1C  01/11/2024    DIABETIC EYE EXAM  02/08/2024    LIPID PANEL  07/11/2024    URINE MICROALBUMIN  07/11/2024    ANNUAL WELLNESS VISIT  11/09/2024    TDAP/TD VACCINES (3 - Td or Tdap) 09/02/2029     HEPATITIS C SCREENING  Completed    INFLUENZA VACCINE  Completed    Pneumococcal Vaccine 65+  Completed    COVID-19 Vaccine  Discontinued    ZOSTER VACCINE  Discontinued    COLORECTAL CANCER SCREENING  Discontinued        Subjective   History of Present Illness    Mahesh Renee is a 78 y.o. male who presents for a Subsequent Wellness Visit and physical exam.    He has been doing well since our last visit together in July 2023 for medication recheck and lab work.     We did reduce his blood pressure regimen earlier this year from lisinopril HCTZ to plain lisinopril given some low blood pressure readings at home and episodes of dizziness.  He has been doing better with changing his blood pressure regimen.     History of diabetes, hypertension, hyperlipidemia, asthma on allergy shots, tricuspid regurgitation with chronic heart murmur and bifascicular block, BPH stable on Flomax with ongoing urology follow-up (Dr. Byers) with yearly PSA monitoring, and history of glaucoma with ongoing ophthalmology follow-up and management     Colonoscopy done last in 2016.  He did have 1 small polyp but was told to repeat in 10 years.  Obtained colon polyp pathology report that did return consistent with hyperplastic polyp.  Discussed he is not due for repeat until 2026 - if he would even like to continue colonoscopies at that time.       PSA up-to-date with urology.     Urine microalbumin positive 7/11/23.  Continues on lisinopril.        CHRONIC CONDITIONS    The following portions of the patient's history were reviewed and updated as appropriate: allergies, current medications, past family history, past medical history, past social history, past surgical history, and problem list.    Outpatient Medications Prior to Visit   Medication Sig Dispense Refill    albuterol sulfate  (90 Base) MCG/ACT inhaler Inhale 2 puffs Every 4 (Four) Hours As Needed for Wheezing.      amLODIPine (NORVASC) 5 MG tablet Take 1 tablet by mouth  Daily. 90 tablet 3    aspirin 81 MG EC tablet aspirin 81 mg tablet,delayed release   Take 1 tablet every day by oral route.      Breo Ellipta 200-25 MCG/ACT inhaler Inhale 1 puff Daily.      Cholecalciferol (Vitamin D3) 50 MCG (2000 UT) tablet Take 1 tablet by mouth Daily.      finasteride (PROSCAR) 5 MG tablet Take 1 tablet by mouth Daily.      glucose blood test strip 1 each by Other route Daily. Used to test blood sugar once a day for non-insulin-dependent diabetes.  Dx E11.9 TRUEMETRIX TEST STRIPS 100 each 3    lisinopril (PRINIVIL,ZESTRIL) 40 MG tablet Take 1 tablet by mouth Daily. (this replaces lisinopril/hctz) 90 tablet 3    Misc Natural Products (PROSTATE HEALTH PO) Take  by mouth.      multivitamin (THERAGRAN) tablet tablet Take 1 tablet by mouth Daily.      Netarsudil-Latanoprost (Rocklatan) 0.02-0.005 % solution Apply  to eye(s) as directed by provider.      tamsulosin (FLOMAX) 0.4 MG capsule 24 hr capsule Take 1 capsule by mouth Daily.      Turmeric 450 MG capsule turmeric      empagliflozin (Jardiance) 25 MG tablet tablet Take 1 tablet by mouth Every Morning. 90 tablet 1    metFORMIN ER (GLUCOPHAGE-XR) 500 MG 24 hr tablet Take 2 tablets by mouth Daily With Breakfast. (Note change back to XR formulation) 180 tablet 1    rosuvastatin (CRESTOR) 5 MG tablet Take 1 tablet by mouth Daily. 90 tablet 1     Facility-Administered Medications Prior to Visit   Medication Dose Route Frequency Provider Last Rate Last Admin    Allergy Serum Injection  0.25 mL Subcutaneous Weekly Sarkis Fairbanks MD   0.25 mL at 09/26/23 8707       Patient Active Problem List   Diagnosis    Essential hypertension    Type 2 diabetes mellitus with microalbuminuria, without long-term current use of insulin    Aortic stenosis, mild    Bifascicular block    Mild intermittent asthma    Tricuspid valve regurgitation    Vitamin D deficiency    Mixed hyperlipidemia    Primary open angle glaucoma (POAG) of both eyes    Seasonal allergic  rhinitis due to pollen    BPH with obstruction/lower urinary tract symptoms    General medical exam       Advance Care Planning:  ACP discussion was held with the patient during this visit. Patient has an advance directive in EMR which is still valid.     Identification of Risk Factors:  Risk factors include: Advance Directive Discussion  Fall Risk  Prostate Cancer Screening .    Review of Systems   Constitutional:  Negative for appetite change, chills, fever and unexpected weight change.   HENT:  Negative for hearing loss.    Eyes:  Negative for visual disturbance.   Respiratory:  Negative for chest tightness, shortness of breath and wheezing.    Cardiovascular:  Negative for chest pain, palpitations and leg swelling.   Gastrointestinal:  Negative for abdominal pain.   Musculoskeletal:  Negative for arthralgias, back pain and gait problem.   Skin:  Negative for rash.   Neurological:  Negative for dizziness and headaches.   Psychiatric/Behavioral:  Negative for agitation and confusion. The patient is not nervous/anxious.        Compared to one year ago, the patient feels his physical health is the same.  Compared to one year ago, the patient feels his mental health is the same.    Objective     Physical Exam  Constitutional:       Appearance: Normal appearance.   HENT:      Head: Normocephalic.      Right Ear: External ear normal.      Left Ear: External ear normal.      Nose: Nose normal.   Eyes:      Pupils: Pupils are equal, round, and reactive to light.   Cardiovascular:      Rate and Rhythm: Normal rate and regular rhythm.      Heart sounds: Murmur heard.   Pulmonary:      Effort: Pulmonary effort is normal.      Breath sounds: Normal breath sounds.   Musculoskeletal:         General: Normal range of motion.      Cervical back: Normal range of motion.   Skin:     General: Skin is warm and dry.   Neurological:      General: No focal deficit present.      Mental Status: He is alert.   Psychiatric:         Mood  "and Affect: Mood normal.         Behavior: Behavior normal.         Thought Content: Thought content normal.          Procedures     Vitals:    11/09/23 0814   BP: 130/76   BP Location: Left arm   Pulse: 64   Temp: 96.9 °F (36.1 °C)   SpO2: 98%   Weight: 76.9 kg (169 lb 8 oz)   Height: 180.3 cm (71\")       BMI is within normal parameters. No other follow-up for BMI required.      Lab Results   Component Value Date    WBC 5.4 10/04/2022    HGB 16.5 10/04/2022    HCT 50.0 10/04/2022    MCV 89 10/04/2022     10/04/2022     Lab Results   Component Value Date    GLUCOSE TNP 07/11/2023    BUN 16 07/11/2023    CREATININE 0.67 (L) 07/11/2023    BCR 24 07/11/2023    K TNP 07/11/2023    CO2 23 07/11/2023    CALCIUM 9.6 07/11/2023    PROTENTOTREF 6.8 07/11/2023    ALBUMIN 4.6 07/11/2023    LABIL2 2.1 07/11/2023    AST 17 07/11/2023    ALT 18 07/11/2023     Lab Results   Component Value Date    TSH 3.100 10/04/2022     No results found for: \"PSA\"  Lab Results   Component Value Date    CHLPL 126 07/11/2023    TRIG 115 07/11/2023    HDL 47 07/11/2023    LDL 58 07/11/2023       Assessment & Plan   Problem List Items Addressed This Visit       Essential hypertension (Chronic)    Current Assessment & Plan     Hypertension is improving with treatment.  Continue current treatment regimen.  Blood pressure will be reassessed at the next regular appointment.         Relevant Medications    amLODIPine (NORVASC) 5 MG tablet    lisinopril (PRINIVIL,ZESTRIL) 40 MG tablet    Other Relevant Orders    CBC Auto Differential    Comprehensive Metabolic Panel    Lipid Panel    TSH    T4, Free    Type 2 diabetes mellitus with microalbuminuria, without long-term current use of insulin (Chronic)    Current Assessment & Plan     Diabetes is improving with treatment.   Continue current treatment regimen.  Diabetes will be reassessed in 6 months.         Relevant Medications    glucose blood test strip    Allergy Serum Injection    lisinopril " (PRINIVIL,ZESTRIL) 40 MG tablet    empagliflozin (Jardiance) 25 MG tablet tablet    metFORMIN ER (GLUCOPHAGE-XR) 500 MG 24 hr tablet    rosuvastatin (CRESTOR) 5 MG tablet    Other Relevant Orders    CBC Auto Differential    Comprehensive Metabolic Panel    Lipid Panel    TSH    T4, Free    Hemoglobin A1c    Vitamin B12    Aortic stenosis, mild (Chronic)    Current Assessment & Plan     Stable murmur.  Ongoing cardiology follow-up         Relevant Medications    amLODIPine (NORVASC) 5 MG tablet    Bifascicular block (Chronic)    Current Assessment & Plan     Heart rate in the 60s.  He does report they told him he would need a pacemaker at some point in the future but at this point is stable without intervention         Mild intermittent asthma (Chronic)    Current Assessment & Plan     Asthma is improving with treatment.  The patient is experiencing no daytime asthma symptoms. He is experiencing no nighttime asthma symptoms.  Continue current regimen             Relevant Medications    albuterol sulfate  (90 Base) MCG/ACT inhaler    Breo Ellipta 200-25 MCG/ACT inhaler    Tricuspid valve regurgitation (Chronic)    Current Assessment & Plan     Stable murmur.  Ongoing cardiology follow-up         Relevant Medications    amLODIPine (NORVASC) 5 MG tablet    Vitamin D deficiency (Chronic)    Current Assessment & Plan     Cont vit D         Relevant Orders    Vitamin D,25-Hydroxy    Mixed hyperlipidemia (Chronic)    Current Assessment & Plan     Lipid abnormalities are improving with treatment.  Pharmacotherapy as ordered.  Lipids will be reassessed in 6 months.         Relevant Medications    rosuvastatin (CRESTOR) 5 MG tablet    Other Relevant Orders    CBC Auto Differential    Comprehensive Metabolic Panel    Lipid Panel    TSH    T4, Free    Primary open angle glaucoma (POAG) of both eyes (Chronic)    Current Assessment & Plan     Continued follow-up and management with ophthalmology.  Medication changes  reviewed and updated from ophthalmology since our last visit.         Relevant Medications    Netarsudil-Latanoprost (Rocklatan) 0.02-0.005 % solution    Seasonal allergic rhinitis due to pollen (Chronic)    Current Assessment & Plan     Stable control with current regimen.         BPH with obstruction/lower urinary tract symptoms (Chronic)    Current Assessment & Plan     Stable control with current regimen.      Continues with proscar and flomax.     PSA monitoring ongoing with urology at this time.          Relevant Medications    tamsulosin (FLOMAX) 0.4 MG capsule 24 hr capsule    finasteride (PROSCAR) 5 MG tablet    General medical exam - Primary    Current Assessment & Plan     Discussed together health maintenance and screening along with vaccination options and healthy diet and exercise habits as part of the preventative counseling at their physical exam today.           Patient Self-Management and Personalized Health Advice  The patient has been provided with information about: diet, exercise, and weight management and preventive services including:   Annual Wellness Visit (AWV).    Outpatient Encounter Medications as of 11/9/2023   Medication Sig Dispense Refill    albuterol sulfate  (90 Base) MCG/ACT inhaler Inhale 2 puffs Every 4 (Four) Hours As Needed for Wheezing.      amLODIPine (NORVASC) 5 MG tablet Take 1 tablet by mouth Daily. 90 tablet 3    aspirin 81 MG EC tablet aspirin 81 mg tablet,delayed release   Take 1 tablet every day by oral route.      Breo Ellipta 200-25 MCG/ACT inhaler Inhale 1 puff Daily.      Cholecalciferol (Vitamin D3) 50 MCG (2000 UT) tablet Take 1 tablet by mouth Daily.      empagliflozin (Jardiance) 25 MG tablet tablet Take 1 tablet by mouth Every Morning. 90 tablet 1    finasteride (PROSCAR) 5 MG tablet Take 1 tablet by mouth Daily.      glucose blood test strip 1 each by Other route Daily. Used to test blood sugar once a day for non-insulin-dependent diabetes.  Dx E11.9  TRUEMETRIX TEST STRIPS 100 each 3    lisinopril (PRINIVIL,ZESTRIL) 40 MG tablet Take 1 tablet by mouth Daily. (this replaces lisinopril/hctz) 90 tablet 3    metFORMIN ER (GLUCOPHAGE-XR) 500 MG 24 hr tablet Take 2 tablets by mouth Daily With Breakfast. 180 tablet 1    Misc Natural Products (PROSTATE HEALTH PO) Take  by mouth.      multivitamin (THERAGRAN) tablet tablet Take 1 tablet by mouth Daily.      Netarsudil-Latanoprost (Rocklatan) 0.02-0.005 % solution Apply  to eye(s) as directed by provider.      rosuvastatin (CRESTOR) 5 MG tablet Take 1 tablet by mouth Daily. 90 tablet 1    tamsulosin (FLOMAX) 0.4 MG capsule 24 hr capsule Take 1 capsule by mouth Daily.      Turmeric 450 MG capsule turmeric      [DISCONTINUED] empagliflozin (Jardiance) 25 MG tablet tablet Take 1 tablet by mouth Every Morning. 90 tablet 1    [DISCONTINUED] metFORMIN ER (GLUCOPHAGE-XR) 500 MG 24 hr tablet Take 2 tablets by mouth Daily With Breakfast. (Note change back to XR formulation) 180 tablet 1    [DISCONTINUED] rosuvastatin (CRESTOR) 5 MG tablet Take 1 tablet by mouth Daily. 90 tablet 1     Facility-Administered Encounter Medications as of 11/9/2023   Medication Dose Route Frequency Provider Last Rate Last Admin    Allergy Serum Injection  0.25 mL Subcutaneous Weekly Sarkis Fairbanks MD   0.25 mL at 09/26/23 0953       Reviewed use of high risk medication in the elderly: yes  Reviewed for potential of harmful drug interactions in the elderly: yes    Diagnoses and all orders for this visit:    1. General medical exam (Primary)  Assessment & Plan:  Discussed together health maintenance and screening along with vaccination options and healthy diet and exercise habits as part of the preventative counseling at their physical exam today.       2. Type 2 diabetes mellitus with microalbuminuria, without long-term current use of insulin  Assessment & Plan:  Diabetes is improving with treatment.   Continue current treatment  regimen.  Diabetes will be reassessed in 6 months.    Orders:  -     CBC Auto Differential; Future  -     Comprehensive Metabolic Panel; Future  -     Lipid Panel; Future  -     TSH; Future  -     T4, Free; Future  -     Hemoglobin A1c; Future  -     Vitamin B12; Future  -     empagliflozin (Jardiance) 25 MG tablet tablet; Take 1 tablet by mouth Every Morning.  Dispense: 90 tablet; Refill: 1  -     metFORMIN ER (GLUCOPHAGE-XR) 500 MG 24 hr tablet; Take 2 tablets by mouth Daily With Breakfast.  Dispense: 180 tablet; Refill: 1  -     rosuvastatin (CRESTOR) 5 MG tablet; Take 1 tablet by mouth Daily.  Dispense: 90 tablet; Refill: 1    3. Essential hypertension  Assessment & Plan:  Hypertension is improving with treatment.  Continue current treatment regimen.  Blood pressure will be reassessed at the next regular appointment.    Orders:  -     CBC Auto Differential; Future  -     Comprehensive Metabolic Panel; Future  -     Lipid Panel; Future  -     TSH; Future  -     T4, Free; Future    4. Mixed hyperlipidemia  Assessment & Plan:  Lipid abnormalities are improving with treatment.  Pharmacotherapy as ordered.  Lipids will be reassessed in 6 months.    Orders:  -     CBC Auto Differential; Future  -     Comprehensive Metabolic Panel; Future  -     Lipid Panel; Future  -     TSH; Future  -     T4, Free; Future  -     rosuvastatin (CRESTOR) 5 MG tablet; Take 1 tablet by mouth Daily.  Dispense: 90 tablet; Refill: 1    5. Mild intermittent asthma without complication  Assessment & Plan:  Asthma is improving with treatment.  The patient is experiencing no daytime asthma symptoms. He is experiencing no nighttime asthma symptoms.  Continue current regimen          6. Seasonal allergic rhinitis due to pollen  Assessment & Plan:  Stable control with current regimen.      7. BPH with obstruction/lower urinary tract symptoms  Assessment & Plan:  Stable control with current regimen.      Continues with proscar and flomax.     PSA  monitoring ongoing with urology at this time.       8. Aortic stenosis, mild  Assessment & Plan:  Stable murmur.  Ongoing cardiology follow-up      9. Bifascicular block  Assessment & Plan:  Heart rate in the 60s.  He does report they told him he would need a pacemaker at some point in the future but at this point is stable without intervention      10. Nonrheumatic tricuspid valve regurgitation  Assessment & Plan:  Stable murmur.  Ongoing cardiology follow-up      11. Vitamin D deficiency  Assessment & Plan:  Cont vit D    Orders:  -     Vitamin D,25-Hydroxy; Future    12. Primary open angle glaucoma of both eyes, unspecified glaucoma stage  Assessment & Plan:  Continued follow-up and management with ophthalmology.  Medication changes reviewed and updated from ophthalmology since our last visit.              Follow Up:  Return in about 4 months (around 3/9/2024) for Med recheck, Fasting for labs at appointment (but drink water!).     There are no Patient Instructions on file for this visit.    An After Visit Summary and PPPS with all of these plans were given to the patient.

## 2023-11-09 NOTE — ASSESSMENT & PLAN NOTE
Heart rate in the 60s.  He does report they told him he would need a pacemaker at some point in the future but at this point is stable without intervention

## 2023-11-10 LAB
25(OH)D3+25(OH)D2 SERPL-MCNC: 53.7 NG/ML (ref 30–100)
ALBUMIN SERPL-MCNC: 4.5 G/DL (ref 3.8–4.8)
ALBUMIN/GLOB SERPL: 2 {RATIO} (ref 1.2–2.2)
ALP SERPL-CCNC: 64 IU/L (ref 44–121)
ALT SERPL-CCNC: 14 IU/L (ref 0–44)
AST SERPL-CCNC: 13 IU/L (ref 0–40)
BASOPHILS # BLD AUTO: 0.1 X10E3/UL (ref 0–0.2)
BASOPHILS NFR BLD AUTO: 1 %
BILIRUB SERPL-MCNC: 0.5 MG/DL (ref 0–1.2)
BUN SERPL-MCNC: 15 MG/DL (ref 8–27)
BUN/CREAT SERPL: 21 (ref 10–24)
CALCIUM SERPL-MCNC: 9.5 MG/DL (ref 8.6–10.2)
CHLORIDE SERPL-SCNC: 105 MMOL/L (ref 96–106)
CHOLEST SERPL-MCNC: 135 MG/DL (ref 100–199)
CO2 SERPL-SCNC: 25 MMOL/L (ref 20–29)
CREAT SERPL-MCNC: 0.7 MG/DL (ref 0.76–1.27)
EGFRCR SERPLBLD CKD-EPI 2021: 95 ML/MIN/1.73
EOSINOPHIL # BLD AUTO: 0.2 X10E3/UL (ref 0–0.4)
EOSINOPHIL NFR BLD AUTO: 3 %
ERYTHROCYTE [DISTWIDTH] IN BLOOD BY AUTOMATED COUNT: 13 % (ref 11.6–15.4)
GLOBULIN SER CALC-MCNC: 2.2 G/DL (ref 1.5–4.5)
GLUCOSE SERPL-MCNC: 132 MG/DL (ref 70–99)
HBA1C MFR BLD: 6.7 % (ref 4.8–5.6)
HCT VFR BLD AUTO: 46.9 % (ref 37.5–51)
HDLC SERPL-MCNC: 52 MG/DL
HGB BLD-MCNC: 15.5 G/DL (ref 13–17.7)
IMM GRANULOCYTES # BLD AUTO: 0 X10E3/UL (ref 0–0.1)
IMM GRANULOCYTES NFR BLD AUTO: 0 %
LDLC SERPL CALC-MCNC: 64 MG/DL (ref 0–99)
LYMPHOCYTES # BLD AUTO: 2 X10E3/UL (ref 0.7–3.1)
LYMPHOCYTES NFR BLD AUTO: 39 %
MCH RBC QN AUTO: 29.8 PG (ref 26.6–33)
MCHC RBC AUTO-ENTMCNC: 33 G/DL (ref 31.5–35.7)
MCV RBC AUTO: 90 FL (ref 79–97)
MONOCYTES # BLD AUTO: 0.5 X10E3/UL (ref 0.1–0.9)
MONOCYTES NFR BLD AUTO: 9 %
NEUTROPHILS # BLD AUTO: 2.4 X10E3/UL (ref 1.4–7)
NEUTROPHILS NFR BLD AUTO: 48 %
PLATELET # BLD AUTO: 182 X10E3/UL (ref 150–450)
POTASSIUM SERPL-SCNC: 4.6 MMOL/L (ref 3.5–5.2)
PROT SERPL-MCNC: 6.7 G/DL (ref 6–8.5)
RBC # BLD AUTO: 5.2 X10E6/UL (ref 4.14–5.8)
SODIUM SERPL-SCNC: 146 MMOL/L (ref 134–144)
T4 FREE SERPL-MCNC: 1.15 NG/DL (ref 0.82–1.77)
TRIGL SERPL-MCNC: 103 MG/DL (ref 0–149)
TSH SERPL DL<=0.005 MIU/L-ACNC: 2.14 UIU/ML (ref 0.45–4.5)
VIT B12 SERPL-MCNC: 347 PG/ML (ref 232–1245)
VLDLC SERPL CALC-MCNC: 19 MG/DL (ref 5–40)
WBC # BLD AUTO: 5.2 X10E3/UL (ref 3.4–10.8)

## 2024-01-16 ENCOUNTER — CLINICAL SUPPORT (OUTPATIENT)
Dept: FAMILY MEDICINE CLINIC | Facility: CLINIC | Age: 79
End: 2024-01-16
Payer: MEDICARE

## 2024-02-13 ENCOUNTER — CLINICAL SUPPORT (OUTPATIENT)
Dept: FAMILY MEDICINE CLINIC | Facility: CLINIC | Age: 79
End: 2024-02-13
Payer: MEDICARE

## 2024-02-22 DIAGNOSIS — I10 ESSENTIAL HYPERTENSION: Chronic | ICD-10-CM

## 2024-02-22 DIAGNOSIS — R80.9 TYPE 2 DIABETES MELLITUS WITH MICROALBUMINURIA, WITHOUT LONG-TERM CURRENT USE OF INSULIN: Chronic | ICD-10-CM

## 2024-02-22 DIAGNOSIS — E11.29 TYPE 2 DIABETES MELLITUS WITH MICROALBUMINURIA, WITHOUT LONG-TERM CURRENT USE OF INSULIN: Chronic | ICD-10-CM

## 2024-02-22 RX ORDER — AMLODIPINE BESYLATE 5 MG/1
5 TABLET ORAL DAILY
Qty: 90 TABLET | Refills: 3 | Status: SHIPPED | OUTPATIENT
Start: 2024-02-22

## 2024-02-22 RX ORDER — LISINOPRIL 40 MG/1
TABLET ORAL
Qty: 90 TABLET | Refills: 3 | Status: SHIPPED | OUTPATIENT
Start: 2024-02-22

## 2024-03-12 ENCOUNTER — CLINICAL SUPPORT (OUTPATIENT)
Dept: FAMILY MEDICINE CLINIC | Facility: CLINIC | Age: 79
End: 2024-03-12
Payer: MEDICARE

## 2024-03-12 DIAGNOSIS — J30.1 SEASONAL ALLERGIC RHINITIS DUE TO POLLEN: Primary | Chronic | ICD-10-CM

## 2024-03-13 ENCOUNTER — OFFICE VISIT (OUTPATIENT)
Dept: FAMILY MEDICINE CLINIC | Facility: CLINIC | Age: 79
End: 2024-03-13
Payer: MEDICARE

## 2024-03-13 VITALS
WEIGHT: 166 LBS | HEART RATE: 52 BPM | BODY MASS INDEX: 23.15 KG/M2 | SYSTOLIC BLOOD PRESSURE: 130 MMHG | DIASTOLIC BLOOD PRESSURE: 64 MMHG | OXYGEN SATURATION: 100 %

## 2024-03-13 DIAGNOSIS — N40.1 BPH WITH OBSTRUCTION/LOWER URINARY TRACT SYMPTOMS: Chronic | ICD-10-CM

## 2024-03-13 DIAGNOSIS — H40.1130 PRIMARY OPEN ANGLE GLAUCOMA OF BOTH EYES, UNSPECIFIED GLAUCOMA STAGE: Chronic | ICD-10-CM

## 2024-03-13 DIAGNOSIS — R80.9 TYPE 2 DIABETES MELLITUS WITH MICROALBUMINURIA, WITHOUT LONG-TERM CURRENT USE OF INSULIN: Primary | Chronic | ICD-10-CM

## 2024-03-13 DIAGNOSIS — E78.2 MIXED HYPERLIPIDEMIA: Chronic | ICD-10-CM

## 2024-03-13 DIAGNOSIS — I35.0 AORTIC STENOSIS, MILD: Chronic | ICD-10-CM

## 2024-03-13 DIAGNOSIS — I45.2 BIFASCICULAR BLOCK: Chronic | ICD-10-CM

## 2024-03-13 DIAGNOSIS — I10 ESSENTIAL HYPERTENSION: Chronic | ICD-10-CM

## 2024-03-13 DIAGNOSIS — I36.1 NONRHEUMATIC TRICUSPID VALVE REGURGITATION: Chronic | ICD-10-CM

## 2024-03-13 DIAGNOSIS — J30.1 SEASONAL ALLERGIC RHINITIS DUE TO POLLEN: Chronic | ICD-10-CM

## 2024-03-13 DIAGNOSIS — J45.20 MILD INTERMITTENT ASTHMA WITHOUT COMPLICATION: Chronic | ICD-10-CM

## 2024-03-13 DIAGNOSIS — E11.29 TYPE 2 DIABETES MELLITUS WITH MICROALBUMINURIA, WITHOUT LONG-TERM CURRENT USE OF INSULIN: Primary | Chronic | ICD-10-CM

## 2024-03-13 DIAGNOSIS — E55.9 VITAMIN D DEFICIENCY: Chronic | ICD-10-CM

## 2024-03-13 DIAGNOSIS — N13.8 BPH WITH OBSTRUCTION/LOWER URINARY TRACT SYMPTOMS: Chronic | ICD-10-CM

## 2024-03-13 LAB
EXPIRATION DATE: ABNORMAL
HBA1C MFR BLD: 6.5 % (ref 4.5–5.7)
Lab: ABNORMAL

## 2024-03-13 RX ORDER — METFORMIN HYDROCHLORIDE 500 MG/1
1000 TABLET, EXTENDED RELEASE ORAL
Qty: 180 TABLET | Refills: 1 | Status: SHIPPED | OUTPATIENT
Start: 2024-03-13

## 2024-03-13 RX ORDER — ALBUTEROL SULFATE 90 UG/1
2 AEROSOL, METERED RESPIRATORY (INHALATION) EVERY 4 HOURS PRN
Qty: 18 G | Refills: 5 | Status: SHIPPED | OUTPATIENT
Start: 2024-03-13

## 2024-03-13 RX ORDER — ROSUVASTATIN CALCIUM 5 MG/1
5 TABLET, COATED ORAL DAILY
Qty: 90 TABLET | Refills: 1 | Status: SHIPPED | OUTPATIENT
Start: 2024-03-13

## 2024-03-13 RX ORDER — AMLODIPINE BESYLATE 5 MG/1
5 TABLET ORAL DAILY
Qty: 90 TABLET | Refills: 1 | Status: SHIPPED | OUTPATIENT
Start: 2024-03-13

## 2024-03-13 RX ORDER — LISINOPRIL 40 MG/1
40 TABLET ORAL DAILY
Qty: 90 TABLET | Refills: 1 | Status: SHIPPED | OUTPATIENT
Start: 2024-03-13

## 2024-03-13 RX ORDER — ASPIRIN 81 MG/1
81 TABLET ORAL DAILY
Start: 2024-03-13

## 2024-03-13 NOTE — ASSESSMENT & PLAN NOTE
Hypertension is stable and controlled  Continue current treatment regimen.  Regular aerobic exercise.  Blood pressure will be reassessed  4 mos .

## 2024-03-13 NOTE — PROGRESS NOTES
Chief Complaint  Diabetes with microalbuminuria, hypertension, hyperlipidemia, mild intermittent asthma, seasonal allergies, BPH, mild aortic stenosis.    Subjective    History of Present Illness:  Mahesh Renee is a 78 y.o. male who presents today for follow-up visit and medication refills.    He has been doing well since our last visit together in November 2023 for his Medicare annual wellness visit and physical exam.    He continues to do well after we reduced his blood pressure regimen in 2023 with cessation of his hydrochlorothiazide given some low blood pressure readings and dizziness episodes.  His episodes have improved after we cut out his diuretic.    History of diabetes, hypertension, hyperlipidemia, asthma on allergy shots, tricuspid regurgitation with chronic heart murmur and bifascicular block, BPH stable on Flomax with ongoing urology follow-up (Dr. Byers) with yearly PSA monitoring, and history of glaucoma with ongoing ophthalmology follow-up and management     Colonoscopy done last in 2016.  He did have 1 small polyp but was told to repeat in 10 years.  Obtained colon polyp pathology report that did return consistent with hyperplastic polyp.  Discussed he is not due for repeat until 2026 - if he would even like to continue colonoscopies at that time.       PSA up-to-date with urology.     Urine microalbumin positive 7/11/23.  Continues on lisinopril.     Encouraged RSV vaccination at his pharmacy.    Unable to give microalbumin sample today    Eye exam uptodate in Feb 2024    A1c today improved down to 6.5!       Objective   Vital Signs:   /64   Pulse 52   Wt 75.3 kg (166 lb)   SpO2 100%   BMI 23.15 kg/m²     Review of Systems   Constitutional:  Negative for appetite change, chills and fever.   HENT:  Negative for hearing loss.    Eyes:  Negative for blurred vision.   Respiratory:  Negative for chest tightness.    Cardiovascular:  Negative for chest pain.   Gastrointestinal:  Negative  for abdominal pain.   Musculoskeletal:  Negative for gait problem.   Skin:  Negative for rash.   Psychiatric/Behavioral:  Negative for depressed mood.        Past History:  Medical History: has a past medical history of Acute allergic rhinitis, Benign essential hypertension, Benign prostatic hyperplasia with urinary obstruction, Bifascicular block, BMI 23.0-23.9, adult, Cholecystitis, Chronic allergic rhinitis, Diabetes mellitus without complication, Erectile dysfunction, Extrinsic asthma, High risk medication use, Low back pain, Mild intermittent asthma without complication, Mixed hyperlipidemia, Nocturia, Nonrheumatic tricuspid valve regurgitation, Obstructive sleep apnea syndrome, Other obstructive and reflux uropathy, Secondary pulmonary arterial hypertension, Tricuspid valve insufficiency, and Vitamin D deficiency.   Surgical History: has a past surgical history that includes Other surgical history; Cholecystectomy; and Inguinal hernia repair.   Family History: family history includes Arthritis in his father and mother; Breast cancer in his mother and sister; Coronary artery disease in his father; Diabetes in his sister; Hyperlipidemia in his father, mother, and sister; Hypertension in his father, mother, and sister; Other in his father and mother; Stroke in his mother.   Social History: reports that he has never smoked. He has never used smokeless tobacco. He reports that he does not currently use alcohol. He reports that he does not use drugs.      Current Outpatient Medications:     albuterol sulfate  (90 Base) MCG/ACT inhaler, Inhale 2 puffs Every 4 (Four) Hours As Needed for Wheezing., Disp: 18 g, Rfl: 5    amLODIPine (NORVASC) 5 MG tablet, Take 1 tablet by mouth Daily., Disp: 90 tablet, Rfl: 1    aspirin 81 MG EC tablet, Take 1 tablet by mouth Daily., Disp: , Rfl:     Breo Ellipta 200-25 MCG/ACT inhaler, Inhale 1 puff Daily., Disp: , Rfl:     Cholecalciferol (Vitamin D3) 50 MCG (2000 UT) tablet,  Take 1 tablet by mouth Daily., Disp: , Rfl:     empagliflozin (Jardiance) 25 MG tablet tablet, Take 1 tablet by mouth Every Morning., Disp: 90 tablet, Rfl: 1    finasteride (PROSCAR) 5 MG tablet, Take 1 tablet by mouth Daily., Disp: , Rfl:     glucose blood test strip, 1 each by Other route Daily. Used to test blood sugar once a day for non-insulin-dependent diabetes.  Dx E11.9 TRUEMETRIX TEST STRIPS, Disp: 100 each, Rfl: 3    lisinopril (PRINIVIL,ZESTRIL) 40 MG tablet, Take 1 tablet by mouth Daily., Disp: 90 tablet, Rfl: 1    metFORMIN ER (GLUCOPHAGE-XR) 500 MG 24 hr tablet, Take 2 tablets by mouth Daily With Breakfast., Disp: 180 tablet, Rfl: 1    Misc Natural Products (PROSTATE HEALTH PO), Take  by mouth., Disp: , Rfl:     multivitamin (THERAGRAN) tablet tablet, Take 1 tablet by mouth Daily., Disp: , Rfl:     Netarsudil-Latanoprost (Rocklatan) 0.02-0.005 % solution, Apply  to eye(s) as directed by provider., Disp: , Rfl:     rosuvastatin (CRESTOR) 5 MG tablet, Take 1 tablet by mouth Daily., Disp: 90 tablet, Rfl: 1    tamsulosin (FLOMAX) 0.4 MG capsule 24 hr capsule, Take 1 capsule by mouth Daily., Disp: , Rfl:     Turmeric 450 MG capsule, turmeric, Disp: , Rfl:     Current Facility-Administered Medications:     Allergy Serum Injection, 0.25 mL, Subcutaneous, Weekly, Sarkis Fairbanks MD, 0.25 mL at 03/12/24 0941    Allergies: Patient has no known allergies.    Physical Exam  Constitutional:       Appearance: Normal appearance.   HENT:      Head: Normocephalic.      Right Ear: External ear normal.      Left Ear: External ear normal.      Nose: Nose normal.   Eyes:      Pupils: Pupils are equal, round, and reactive to light.   Cardiovascular:      Rate and Rhythm: Normal rate and regular rhythm.      Heart sounds: Murmur heard.   Pulmonary:      Effort: Pulmonary effort is normal.      Breath sounds: Normal breath sounds.   Musculoskeletal:         General: Normal range of motion.      Cervical back: Normal  range of motion.   Skin:     General: Skin is warm and dry.   Neurological:      General: No focal deficit present.      Mental Status: He is alert.   Psychiatric:         Mood and Affect: Mood normal.         Behavior: Behavior normal.         Thought Content: Thought content normal.          Result Review                   Assessment and Plan  Diagnoses and all orders for this visit:    1. Type 2 diabetes mellitus with microalbuminuria, without long-term current use of insulin (Primary)  Assessment & Plan:  Diabetes is improving with treatment.   Continue current treatment regimen.  Diabetes will be reassessed  4 mos .    Orders:  -     Cancel: POCT microalbumin  -     POCT glycated hemoglobin, total  -     aspirin 81 MG EC tablet; Take 1 tablet by mouth Daily.  -     empagliflozin (Jardiance) 25 MG tablet tablet; Take 1 tablet by mouth Every Morning.  Dispense: 90 tablet; Refill: 1  -     lisinopril (PRINIVIL,ZESTRIL) 40 MG tablet; Take 1 tablet by mouth Daily.  Dispense: 90 tablet; Refill: 1  -     metFORMIN ER (GLUCOPHAGE-XR) 500 MG 24 hr tablet; Take 2 tablets by mouth Daily With Breakfast.  Dispense: 180 tablet; Refill: 1  -     rosuvastatin (CRESTOR) 5 MG tablet; Take 1 tablet by mouth Daily.  Dispense: 90 tablet; Refill: 1    2. Essential hypertension  Assessment & Plan:  Hypertension is stable and controlled  Continue current treatment regimen.  Regular aerobic exercise.  Blood pressure will be reassessed  4 mos .    Orders:  -     amLODIPine (NORVASC) 5 MG tablet; Take 1 tablet by mouth Daily.  Dispense: 90 tablet; Refill: 1  -     lisinopril (PRINIVIL,ZESTRIL) 40 MG tablet; Take 1 tablet by mouth Daily.  Dispense: 90 tablet; Refill: 1    3. Mixed hyperlipidemia  Assessment & Plan:   Lipid abnormalities are improving with treatment    Plan:  Continue same medication/s without change.      Discussed medication dosage, use, side effects, and goals of treatment in detail.    Counseled patient on lifestyle  modifications to help control hyperlipidemia.     Patient Treatment Goals:   LDL goal is under 100    Followup at the next regular appointment.    Orders:  -     rosuvastatin (CRESTOR) 5 MG tablet; Take 1 tablet by mouth Daily.  Dispense: 90 tablet; Refill: 1    4. Seasonal allergic rhinitis due to pollen  Assessment & Plan:  Stable control with current regimen.      5. BPH with obstruction/lower urinary tract symptoms  Assessment & Plan:  Stable control with current regimen.      Continues with proscar and flomax.     PSA monitoring ongoing with urology at this time.       6. Mild intermittent asthma without complication  Assessment & Plan:  Asthma is improving with treatment.  The patient is experiencing no daytime asthma symptoms. He is experiencing no nighttime asthma symptoms.  Continue current regimen        Orders:  -     albuterol sulfate  (90 Base) MCG/ACT inhaler; Inhale 2 puffs Every 4 (Four) Hours As Needed for Wheezing.  Dispense: 18 g; Refill: 5    7. Vitamin D deficiency  Assessment & Plan:  Cont vit D      8. Aortic stenosis, mild  Assessment & Plan:  Stable murmur.  Ongoing cardiology follow-up      9. Nonrheumatic tricuspid valve regurgitation  Assessment & Plan:  Stable murmur.  Ongoing cardiology follow-up      10. Bifascicular block  Assessment & Plan:  Heart rate in the 60s.  He does report they told him he would need a pacemaker at some point in the future but at this point is stable without intervention      11. Primary open angle glaucoma of both eyes, unspecified glaucoma stage  Assessment & Plan:  Continued follow-up and management with ophthalmology.  Medication changes reviewed and updated from ophthalmology since our last visit.          BMI is within normal parameters. No other follow-up for BMI required.          Follow Up  Return in about 4 months (around 7/13/2024) for Med recheck, Fasting for labs at appointment (but drink water!).    Sarkis Fairbanks MD

## 2024-04-09 ENCOUNTER — CLINICAL SUPPORT (OUTPATIENT)
Dept: FAMILY MEDICINE CLINIC | Facility: CLINIC | Age: 79
End: 2024-04-09
Payer: MEDICARE

## 2024-05-01 ENCOUNTER — TELEPHONE (OUTPATIENT)
Dept: CARDIOLOGY | Facility: CLINIC | Age: 79
End: 2024-05-01
Payer: MEDICARE

## 2024-05-07 ENCOUNTER — CLINICAL SUPPORT (OUTPATIENT)
Dept: FAMILY MEDICINE CLINIC | Facility: CLINIC | Age: 79
End: 2024-05-07
Payer: MEDICARE

## 2024-05-07 DIAGNOSIS — J30.1 SEASONAL ALLERGIC RHINITIS DUE TO POLLEN: Primary | Chronic | ICD-10-CM

## 2024-05-07 PROCEDURE — 95117 IMMUNOTHERAPY INJECTIONS: CPT | Performed by: FAMILY MEDICINE

## 2024-05-28 ENCOUNTER — OFFICE VISIT (OUTPATIENT)
Dept: CARDIOLOGY | Facility: CLINIC | Age: 79
End: 2024-05-28
Payer: MEDICARE

## 2024-05-28 VITALS
RESPIRATION RATE: 16 BRPM | DIASTOLIC BLOOD PRESSURE: 82 MMHG | SYSTOLIC BLOOD PRESSURE: 132 MMHG | HEIGHT: 71 IN | TEMPERATURE: 98.1 F | BODY MASS INDEX: 23.63 KG/M2 | OXYGEN SATURATION: 98 % | HEART RATE: 58 BPM | WEIGHT: 168.8 LBS

## 2024-05-28 DIAGNOSIS — I45.2 BIFASCICULAR BLOCK: Chronic | ICD-10-CM

## 2024-05-28 DIAGNOSIS — I10 ESSENTIAL HYPERTENSION: Chronic | ICD-10-CM

## 2024-05-28 DIAGNOSIS — I35.0 AORTIC STENOSIS, MILD: Primary | Chronic | ICD-10-CM

## 2024-05-28 DIAGNOSIS — E11.29 TYPE 2 DIABETES MELLITUS WITH MICROALBUMINURIA, WITHOUT LONG-TERM CURRENT USE OF INSULIN: Chronic | ICD-10-CM

## 2024-05-28 DIAGNOSIS — E78.2 MIXED HYPERLIPIDEMIA: Chronic | ICD-10-CM

## 2024-05-28 DIAGNOSIS — R80.9 TYPE 2 DIABETES MELLITUS WITH MICROALBUMINURIA, WITHOUT LONG-TERM CURRENT USE OF INSULIN: Chronic | ICD-10-CM

## 2024-05-28 PROCEDURE — 93000 ELECTROCARDIOGRAM COMPLETE: CPT | Performed by: INTERNAL MEDICINE

## 2024-05-28 PROCEDURE — 3079F DIAST BP 80-89 MM HG: CPT | Performed by: INTERNAL MEDICINE

## 2024-05-28 PROCEDURE — 3075F SYST BP GE 130 - 139MM HG: CPT | Performed by: INTERNAL MEDICINE

## 2024-05-28 PROCEDURE — 99214 OFFICE O/P EST MOD 30 MIN: CPT | Performed by: INTERNAL MEDICINE

## 2024-05-28 PROCEDURE — 1160F RVW MEDS BY RX/DR IN RCRD: CPT | Performed by: INTERNAL MEDICINE

## 2024-05-28 PROCEDURE — 1159F MED LIST DOCD IN RCRD: CPT | Performed by: INTERNAL MEDICINE

## 2024-05-28 RX ORDER — AMMONIUM LACTATE 12 G/100G
CREAM TOPICAL
COMMUNITY
Start: 2024-05-15

## 2024-05-28 RX ORDER — GLIMEPIRIDE 2 MG/1
1 TABLET ORAL 2 TIMES DAILY
COMMUNITY
Start: 2024-04-24

## 2024-05-28 RX ORDER — CLINDAMYCIN PHOSPHATE 10 UG/ML
LOTION TOPICAL
COMMUNITY
Start: 2024-04-11

## 2024-05-28 NOTE — PROGRESS NOTES
[facility]  [facility]  [address] [place] [address]  Dept: [phone]  Dept Fax: [phone]    Mahesh Renee  1945    Follow Up Office Visit Note    History of Present Illness:  Mahesh Renee is a 78 y.o. male who presents to the clinic for Aortic stenosis - He seems doing well no complaints, his new echo about the same MARSHA 1,2 mean gradient 20 and aortic velocity 3,0, no CP, no SOB, no syncope EKG  sinus with bifascicular block HR 59 RBBB and lASHB    The following portions of the patient's history were reviewed and updated as appropriate: allergies, current medications, past family history, past medical history, past social history, past surgical history, and problem list.    Medications:  albuterol sulfate HFA  Allergy Serum Injection  amLODIPine  ammonium lactate  aspirin  Breo Ellipta aerosol powder   clindamycin  empagliflozin tablet  finasteride  glucose blood  lisinopril  metFORMIN ER  multivitamin tablet  mupirocin  PROSTATE HEALTH PO  Rocklatan solution  rosuvastatin  tamsulosin capsule  timolol  Turmeric capsule  Vitamin D3 tablet    Subjective  No Known Allergies     Past Medical History:   Diagnosis Date   • Acute allergic rhinitis    • Benign essential hypertension    • Benign prostatic hyperplasia with urinary obstruction    • Bifascicular block    • BMI 23.0-23.9, adult    • Cholecystitis    • Chronic allergic rhinitis    • Diabetes mellitus without complication    • Erectile dysfunction    • Extrinsic asthma    • High risk medication use    • Low back pain    • Mild intermittent asthma without complication    • Mixed hyperlipidemia    • Nocturia    • Nonrheumatic tricuspid valve regurgitation    • Obstructive sleep apnea syndrome    • Other obstructive and reflux uropathy    • Secondary pulmonary arterial hypertension    • Tricuspid valve insufficiency    • Vitamin D deficiency        Past Surgical History:   Procedure Laterality Date  "  • CHOLECYSTECTOMY     • INGUINAL HERNIA REPAIR     • OTHER SURGICAL HISTORY      UVULA SURGER       Family History   Problem Relation Age of Onset   • Stroke Mother    • Other Mother         CAROTID OR VASCULAR DISEASE   • Hypertension Mother    • Hyperlipidemia Mother    • Arthritis Mother    • Breast cancer Mother    • Hypertension Father    • Arthritis Father    • Hyperlipidemia Father    • Other Father         CAROTID OR VASCULAR DISEASE   • Coronary artery disease Father    • Breast cancer Sister    • Hypertension Sister    • Diabetes Sister    • Hyperlipidemia Sister         Social History     Socioeconomic History   • Marital status:    Tobacco Use   • Smoking status: Never   • Smokeless tobacco: Never   Vaping Use   • Vaping status: Never Used   Substance and Sexual Activity   • Alcohol use: Not Currently   • Drug use: Never   • Sexual activity: Defer       Review of Systems   Constitutional: Negative.    HENT: Negative.     Respiratory: Negative.     Cardiovascular: Negative.    Endocrine: Negative.    Genitourinary: Negative.    Musculoskeletal: Negative.    Skin: Negative.    Allergic/Immunologic: Negative.    Neurological: Negative.    Hematological: Negative.    Psychiatric/Behavioral: Negative.       Cardiovascular Procedures    ECHO/MUGA:  STRESS TESTS:   CARDIAC CATH:   DEVICES:   HOLTER:   CT/MRI:   VASCULAR:   CARDIOTHORACIC:     Objective  Vitals:    05/28/24 1006   BP: 132/82   Pulse: 58   Resp: 16   Temp: 98.1 °F (36.7 °C)   SpO2: 98%   Weight: 76.6 kg (168 lb 12.8 oz)   Height: 180.3 cm (70.98\")   PainSc: 0-No pain     Body mass index is 23.55 kg/m².     Physical Exam  Vitals reviewed.   Constitutional:       Appearance: Healthy appearance. Not in distress.   Eyes:      Pupils: Pupils are equal, round, and reactive to light.   HENT:    Mouth/Throat:      Pharynx: Oropharynx is clear.   Neck:      Thyroid: Thyroid normal.      Vascular: No JVR. JVD normal.   Pulmonary:      Effort: " Pulmonary effort is normal.      Breath sounds: Normal breath sounds. No wheezing. No rhonchi. No rales.   Chest:      Chest wall: Not tender to palpatation.   Cardiovascular:      PMI at left midclavicular line. Normal rate. Regular rhythm. Normal S1. Normal S2.       Murmurs: There is no murmur.      No gallop.  No click. No rub.   Pulses:     Intact distal pulses.      Carotid: 3+ bilaterally.     Radial: 3+ bilaterally.     Femoral: 3+ bilaterally.     Dorsalis pedis: 3+ bilaterally.     Posterior tibial: 3+ bilaterally.  Edema:     Peripheral edema absent.   Abdominal:      General: Bowel sounds are normal.      Palpations: Abdomen is soft.      Tenderness: There is no abdominal tenderness.   Musculoskeletal: Normal range of motion.         General: No tenderness.      Cervical back: Normal range of motion and neck supple. Skin:     General: Skin is warm and dry.   Neurological:      General: No focal deficit present.      Mental Status: Alert and oriented to person, place and time.        Diagnostic Data    ECG 12 Lead    Date/Time: 5/28/2024 10:37 AM  Performed by: Joseph Armstrong MD    Authorized by: Joseph Armstrong MD  Comparison: compared with previous ECG from 5/26/2023  Similar to previous ECG  Rhythm: sinus rhythm and sinus bradycardia  Rate: bradycardic  BPM: 59  Conduction: right bundle branch block and left anterior fascicular block  QRS axis: left    Clinical impression: abnormal EKG        Assessment and Plan  Diagnoses and all orders for this visit:    Aortic stenosis, mild to moderate no changes, MARSHA 1,2 , mean gradient 20 and aortic velocity 3,0  -     Cancel: Adult Transthoracic Echo Complete W/ Cont if Necessary Per Protocol; Future  -     Adult Transthoracic Echo Complete W/ Cont if Necessary Per Protocol; Future    Bifascicular block- No changes HR 59, RBBB and lASHB    Essential hypertension- BP is fine 120.70 on Lisinopril 40 mg and also Amlodipine 5mg    Mixed  hyperlipidemia- On Crestor 5 mg, LDL is 52    Type 2 diabetes mellitus with microalbuminuria, without long-term current use of insulin    Other orders  -     timolol (TIMOPTIC) 0.25 % ophthalmic solution; Administer 1 drop to both eyes 2 (Two) Times a Day.  -     ammonium lactate (AMLACTIN) 12 % cream; APPLY ENOUGH TO COER AREAS OF DRY SKIN ONCE A DAY  -     clindamycin (CLEOCIN T) 1 % lotion; APPLY TOPICALLY TO RASH DAILY  -     mupirocin (BACTROBAN) 2 % ointment; APPLY TOPICALLY TO AFFECTED AREAS ONCE DAILY         Return in about 1 year (around 5/28/2025) for Recheck with Dr. Armstrong.    Joseph Armstrong MD  05/28/2024

## 2024-06-04 ENCOUNTER — CLINICAL SUPPORT (OUTPATIENT)
Dept: FAMILY MEDICINE CLINIC | Facility: CLINIC | Age: 79
End: 2024-06-04
Payer: MEDICARE

## 2024-06-04 NOTE — PROGRESS NOTES
Patient came in for his allergy shot.  Patient was told that we do not give them anymore. MA (Shell Velazquez) gave patient's [facility] I spoke with manager and she stated that we will need to keep the vials for patient until he goes to his allergist.  Patient did not want to go back to his allergist in [place].  He is looking for one closer.  Patient would not let us keep his vials.  Patient stated they are his and he is the one that bought them in. Patient left.

## 2024-07-16 ENCOUNTER — OFFICE VISIT (OUTPATIENT)
Dept: FAMILY MEDICINE CLINIC | Facility: CLINIC | Age: 79
End: 2024-07-16
Payer: MEDICARE

## 2024-07-16 VITALS
OXYGEN SATURATION: 99 % | HEIGHT: 69 IN | DIASTOLIC BLOOD PRESSURE: 82 MMHG | WEIGHT: 169.1 LBS | HEART RATE: 64 BPM | BODY MASS INDEX: 25.04 KG/M2 | SYSTOLIC BLOOD PRESSURE: 144 MMHG

## 2024-07-16 DIAGNOSIS — I35.0 AORTIC STENOSIS, MILD: Chronic | ICD-10-CM

## 2024-07-16 DIAGNOSIS — E55.9 VITAMIN D DEFICIENCY: ICD-10-CM

## 2024-07-16 DIAGNOSIS — J45.20 MILD INTERMITTENT ASTHMA WITHOUT COMPLICATION: Chronic | ICD-10-CM

## 2024-07-16 DIAGNOSIS — N40.1 BPH WITH OBSTRUCTION/LOWER URINARY TRACT SYMPTOMS: Chronic | ICD-10-CM

## 2024-07-16 DIAGNOSIS — I10 ESSENTIAL HYPERTENSION: Chronic | ICD-10-CM

## 2024-07-16 DIAGNOSIS — H40.1130 PRIMARY OPEN ANGLE GLAUCOMA OF BOTH EYES, UNSPECIFIED GLAUCOMA STAGE: Chronic | ICD-10-CM

## 2024-07-16 DIAGNOSIS — E11.29 TYPE 2 DIABETES MELLITUS WITH MICROALBUMINURIA, WITHOUT LONG-TERM CURRENT USE OF INSULIN: Primary | Chronic | ICD-10-CM

## 2024-07-16 DIAGNOSIS — J30.1 SEASONAL ALLERGIC RHINITIS DUE TO POLLEN: Chronic | ICD-10-CM

## 2024-07-16 DIAGNOSIS — E78.2 MIXED HYPERLIPIDEMIA: Chronic | ICD-10-CM

## 2024-07-16 DIAGNOSIS — N13.8 BPH WITH OBSTRUCTION/LOWER URINARY TRACT SYMPTOMS: Chronic | ICD-10-CM

## 2024-07-16 DIAGNOSIS — I36.1 NONRHEUMATIC TRICUSPID VALVE REGURGITATION: Chronic | ICD-10-CM

## 2024-07-16 DIAGNOSIS — R80.9 TYPE 2 DIABETES MELLITUS WITH MICROALBUMINURIA, WITHOUT LONG-TERM CURRENT USE OF INSULIN: Primary | Chronic | ICD-10-CM

## 2024-07-16 LAB
EXPIRATION DATE: ABNORMAL
EXPIRATION DATE: NORMAL
HBA1C MFR BLD: 6.5 % (ref 4.5–5.7)
Lab: ABNORMAL
Lab: NORMAL
POC CREATININE URINE: 50
POC MICROALBUMIN URINE: 30

## 2024-07-16 PROCEDURE — 83036 HEMOGLOBIN GLYCOSYLATED A1C: CPT | Performed by: FAMILY MEDICINE

## 2024-07-16 PROCEDURE — 3079F DIAST BP 80-89 MM HG: CPT | Performed by: FAMILY MEDICINE

## 2024-07-16 PROCEDURE — 1159F MED LIST DOCD IN RCRD: CPT | Performed by: FAMILY MEDICINE

## 2024-07-16 PROCEDURE — G2211 COMPLEX E/M VISIT ADD ON: HCPCS | Performed by: FAMILY MEDICINE

## 2024-07-16 PROCEDURE — 36415 COLL VENOUS BLD VENIPUNCTURE: CPT | Performed by: FAMILY MEDICINE

## 2024-07-16 PROCEDURE — 99214 OFFICE O/P EST MOD 30 MIN: CPT | Performed by: FAMILY MEDICINE

## 2024-07-16 PROCEDURE — 1126F AMNT PAIN NOTED NONE PRSNT: CPT | Performed by: FAMILY MEDICINE

## 2024-07-16 PROCEDURE — 82044 UR ALBUMIN SEMIQUANTITATIVE: CPT | Performed by: FAMILY MEDICINE

## 2024-07-16 PROCEDURE — 3044F HG A1C LEVEL LT 7.0%: CPT | Performed by: FAMILY MEDICINE

## 2024-07-16 PROCEDURE — 1160F RVW MEDS BY RX/DR IN RCRD: CPT | Performed by: FAMILY MEDICINE

## 2024-07-16 PROCEDURE — 3077F SYST BP >= 140 MM HG: CPT | Performed by: FAMILY MEDICINE

## 2024-07-16 RX ORDER — LISINOPRIL 40 MG/1
40 TABLET ORAL DAILY
Qty: 90 TABLET | Refills: 1 | Status: SHIPPED | OUTPATIENT
Start: 2024-07-16

## 2024-07-16 RX ORDER — AMLODIPINE BESYLATE 5 MG/1
5 TABLET ORAL DAILY
Qty: 90 TABLET | Refills: 1 | Status: SHIPPED | OUTPATIENT
Start: 2024-07-16

## 2024-07-16 RX ORDER — ROSUVASTATIN CALCIUM 5 MG/1
5 TABLET, COATED ORAL DAILY
Qty: 90 TABLET | Refills: 1 | Status: SHIPPED | OUTPATIENT
Start: 2024-07-16

## 2024-07-16 RX ORDER — ASPIRIN 81 MG/1
81 TABLET ORAL DAILY
Start: 2024-07-16

## 2024-07-16 RX ORDER — METFORMIN HYDROCHLORIDE 500 MG/1
1000 TABLET, EXTENDED RELEASE ORAL
Qty: 180 TABLET | Refills: 1 | Status: SHIPPED | OUTPATIENT
Start: 2024-07-16

## 2024-07-16 NOTE — ASSESSMENT & PLAN NOTE
Mild elevation today discussed    Problems with low BP and dizzy episodes earlier this year.    Restart home BP checks and notify us if staying over 140/90    Recheck at followup in Nov or sooner if problems arise

## 2024-07-16 NOTE — PROGRESS NOTES
"Chief Complaint  Diabetes, Hypertension, and Hyperlipidemia    Subjective    History of Present Illness:  Mahesh Renee is a 78 y.o. male who presents today for follow-up visit and medication refills.    He has been doing well since our last visit together in March.  AWV uptodate Nov 2023    He continues to do well after we reduced his blood pressure regimen in 2023 with cessation of his hydrochlorothiazide given some low blood pressure readings and dizziness episodes.  His episodes have improved after we cut out his diuretic.    History of diabetes, hypertension, hyperlipidemia, asthma on allergy shots, tricuspid regurgitation with chronic heart murmur and bifascicular block, BPH stable on Flomax with ongoing urology follow-up (Dr. Byers) with yearly PSA monitoring, and history of glaucoma with ongoing ophthalmology follow-up and management     Colonoscopy done last in 2016.  He did have 1 small polyp but was told to repeat in 10 years.  Obtained colon polyp pathology report that did return consistent with hyperplastic polyp.  Discussed he is not due for repeat until 2026 - if he would even like to continue colonoscopies at that time.       PSA up-to-date with urology.     Urine microalbumin positive 7/2024.  Continues on lisinopril.   Eye exam uptodate in Feb 2024    A1c today remains improved at 6.5!       Objective   Vital Signs:   /82   Pulse 64   Ht 175.3 cm (69\")   Wt 76.7 kg (169 lb 1.6 oz)   SpO2 99%   BMI 24.97 kg/m²     Review of Systems   Constitutional:  Negative for appetite change, chills and fever.   HENT:  Negative for hearing loss.    Eyes:  Positive for blurred vision.        Ongoing followup with ophthalmology for glaucoma treatment   Respiratory:  Negative for chest tightness.    Cardiovascular:  Negative for chest pain.   Gastrointestinal:  Negative for abdominal pain.   Musculoskeletal:  Negative for gait problem.   Skin:  Negative for rash.   Psychiatric/Behavioral:  Negative " for depressed mood.        Past History:  Medical History: has a past medical history of Acute allergic rhinitis, Benign essential hypertension, Benign prostatic hyperplasia with urinary obstruction, Bifascicular block, BMI 23.0-23.9, adult, Cholecystitis, Chronic allergic rhinitis, Diabetes mellitus without complication, Erectile dysfunction, Extrinsic asthma, High risk medication use, Low back pain, Mild intermittent asthma without complication, Mixed hyperlipidemia, Nocturia, Nonrheumatic tricuspid valve regurgitation, Obstructive sleep apnea syndrome, Other obstructive and reflux uropathy, Secondary pulmonary arterial hypertension, Tricuspid valve insufficiency, and Vitamin D deficiency.   Surgical History: has a past surgical history that includes Other surgical history; Cholecystectomy; and Inguinal hernia repair.   Family History: family history includes Arthritis in his father and mother; Breast cancer in his mother and sister; Coronary artery disease in his father; Diabetes in his sister; Hyperlipidemia in his father, mother, and sister; Hypertension in his father, mother, and sister; Other in his father and mother; Stroke in his mother.   Social History: reports that he has never smoked. He has never used smokeless tobacco. He reports that he does not currently use alcohol. He reports that he does not use drugs.      Current Outpatient Medications:     albuterol sulfate  (90 Base) MCG/ACT inhaler, Inhale 2 puffs Every 4 (Four) Hours As Needed for Wheezing., Disp: 18 g, Rfl: 5    amLODIPine (NORVASC) 5 MG tablet, Take 1 tablet by mouth Daily., Disp: 90 tablet, Rfl: 1    ammonium lactate (AMLACTIN) 12 % cream, APPLY ENOUGH TO COER AREAS OF DRY SKIN ONCE A DAY, Disp: , Rfl:     aspirin 81 MG EC tablet, Take 1 tablet by mouth Daily., Disp: , Rfl:     Breo Ellipta 200-25 MCG/ACT inhaler, Inhale 1 puff Daily., Disp: , Rfl:     Cholecalciferol (Vitamin D3) 50 MCG (2000 UT) tablet, Take 1 tablet by mouth  Daily., Disp: , Rfl:     clindamycin (CLEOCIN T) 1 % lotion, APPLY TOPICALLY TO RASH DAILY, Disp: , Rfl:     empagliflozin (Jardiance) 25 MG tablet tablet, Take 1 tablet by mouth Every Morning., Disp: 90 tablet, Rfl: 1    finasteride (PROSCAR) 5 MG tablet, Take 1 tablet by mouth Daily., Disp: , Rfl:     glucose blood test strip, 1 each by Other route Daily. Used to test blood sugar once a day for non-insulin-dependent diabetes.  Dx E11.9 TRUEMETRIX TEST STRIPS, Disp: 100 each, Rfl: 3    lisinopril (PRINIVIL,ZESTRIL) 40 MG tablet, Take 1 tablet by mouth Daily., Disp: 90 tablet, Rfl: 1    metFORMIN ER (GLUCOPHAGE-XR) 500 MG 24 hr tablet, Take 2 tablets by mouth Daily With Breakfast., Disp: 180 tablet, Rfl: 1    Misc Natural Products (PROSTATE HEALTH PO), Take  by mouth., Disp: , Rfl:     multivitamin (THERAGRAN) tablet tablet, Take 1 tablet by mouth Daily., Disp: , Rfl:     mupirocin (BACTROBAN) 2 % ointment, APPLY TOPICALLY TO AFFECTED AREAS ONCE DAILY, Disp: , Rfl:     Netarsudil-Latanoprost (Rocklatan) 0.02-0.005 % solution, Apply  to eye(s) as directed by provider., Disp: , Rfl:     rosuvastatin (CRESTOR) 5 MG tablet, Take 1 tablet by mouth Daily., Disp: 90 tablet, Rfl: 1    tamsulosin (FLOMAX) 0.4 MG capsule 24 hr capsule, Take 1 capsule by mouth Daily., Disp: , Rfl:     timolol (TIMOPTIC) 0.25 % ophthalmic solution, Administer 1 drop to both eyes 2 (Two) Times a Day., Disp: , Rfl:     Turmeric 450 MG capsule, turmeric, Disp: , Rfl:   No current facility-administered medications for this visit.    Allergies: Patient has no known allergies.    Physical Exam  Constitutional:       Appearance: Normal appearance.   HENT:      Head: Normocephalic.      Right Ear: External ear normal.      Left Ear: External ear normal.      Nose: Nose normal.   Eyes:      Pupils: Pupils are equal, round, and reactive to light.   Cardiovascular:      Rate and Rhythm: Normal rate and regular rhythm.      Heart sounds: Murmur heard.    Pulmonary:      Effort: Pulmonary effort is normal.      Breath sounds: Normal breath sounds.   Musculoskeletal:         General: Normal range of motion.      Cervical back: Normal range of motion.   Skin:     General: Skin is warm and dry.   Neurological:      General: No focal deficit present.      Mental Status: He is alert.   Psychiatric:         Mood and Affect: Mood normal.         Behavior: Behavior normal.         Thought Content: Thought content normal.          Result Review                   Assessment and Plan  Diagnoses and all orders for this visit:    1. Type 2 diabetes mellitus with microalbuminuria, without long-term current use of insulin (Primary)  Assessment & Plan:  Diabetes is improving with treatment.   Continue current treatment regimen.  Diabetes will be reassessed  4 mos .    Orders:  -     POCT glycated hemoglobin, total  -     POCT microalbumin  -     aspirin 81 MG EC tablet; Take 1 tablet by mouth Daily.  -     empagliflozin (Jardiance) 25 MG tablet tablet; Take 1 tablet by mouth Every Morning.  Dispense: 90 tablet; Refill: 1  -     lisinopril (PRINIVIL,ZESTRIL) 40 MG tablet; Take 1 tablet by mouth Daily.  Dispense: 90 tablet; Refill: 1  -     metFORMIN ER (GLUCOPHAGE-XR) 500 MG 24 hr tablet; Take 2 tablets by mouth Daily With Breakfast.  Dispense: 180 tablet; Refill: 1  -     rosuvastatin (CRESTOR) 5 MG tablet; Take 1 tablet by mouth Daily.  Dispense: 90 tablet; Refill: 1  -     CBC Auto Differential; Future  -     Comprehensive Metabolic Panel; Future  -     Lipid Panel; Future  -     TSH; Future  -     T4, Free; Future  -     Vitamin B12; Future    2. Essential hypertension  Assessment & Plan:  Mild elevation today discussed    Problems with low BP and dizzy episodes earlier this year.    Restart home BP checks and notify us if staying over 140/90    Recheck at followup in Nov or sooner if problems arise     Orders:  -     amLODIPine (NORVASC) 5 MG tablet; Take 1 tablet by mouth  Daily.  Dispense: 90 tablet; Refill: 1  -     lisinopril (PRINIVIL,ZESTRIL) 40 MG tablet; Take 1 tablet by mouth Daily.  Dispense: 90 tablet; Refill: 1  -     CBC Auto Differential; Future  -     Comprehensive Metabolic Panel; Future  -     Lipid Panel; Future  -     TSH; Future  -     T4, Free; Future    3. Mixed hyperlipidemia  Assessment & Plan:   Lipid abnormalities are improving with treatment    Plan:  Continue same medication/s without change.      Discussed medication dosage, use, side effects, and goals of treatment in detail.    Counseled patient on lifestyle modifications to help control hyperlipidemia.     Patient Treatment Goals:   LDL goal is under 100    Followup at the next regular appointment.    Orders:  -     rosuvastatin (CRESTOR) 5 MG tablet; Take 1 tablet by mouth Daily.  Dispense: 90 tablet; Refill: 1  -     CBC Auto Differential; Future  -     Comprehensive Metabolic Panel; Future  -     Lipid Panel; Future  -     TSH; Future  -     T4, Free; Future    4. Mild intermittent asthma without complication  Assessment & Plan:  Asthma is improving with treatment.  The patient is experiencing no daytime asthma symptoms. He is experiencing no nighttime asthma symptoms.  Continue current regimen          5. Seasonal allergic rhinitis due to pollen  Assessment & Plan:  Stable control with current regimen.      6. BPH with obstruction/lower urinary tract symptoms  Assessment & Plan:  Stable control with current regimen.      Continues with proscar and flomax.     PSA monitoring ongoing with urology at this time.       7. Aortic stenosis, mild  Assessment & Plan:  Stable murmur.  Ongoing cardiology follow-up      8. Nonrheumatic tricuspid valve regurgitation  Assessment & Plan:  Stable murmur.  Ongoing cardiology follow-up      9. Vitamin D deficiency  Assessment & Plan:  Cont vit D    Orders:  -     Vitamin D,25-Hydroxy; Future    10. Primary open angle glaucoma of both eyes, unspecified glaucoma  stage  Assessment & Plan:  Continued follow-up and management with ophthalmology.  Medication changes reviewed and updated from ophthalmology since our last visit.          BMI is within normal parameters. No other follow-up for BMI required.          Follow Up  Return in about 4 months (around 11/16/2024) for Medicare Wellness.    Sarkis Fairbanks MD

## 2024-07-17 LAB
25(OH)D3+25(OH)D2 SERPL-MCNC: 40.4 NG/ML (ref 30–100)
ALBUMIN SERPL-MCNC: 4.5 G/DL (ref 3.8–4.8)
ALP SERPL-CCNC: 61 IU/L (ref 44–121)
ALT SERPL-CCNC: 15 IU/L (ref 0–44)
AST SERPL-CCNC: 12 IU/L (ref 0–40)
BASOPHILS # BLD AUTO: 0 X10E3/UL (ref 0–0.2)
BASOPHILS NFR BLD AUTO: 1 %
BILIRUB SERPL-MCNC: 0.6 MG/DL (ref 0–1.2)
BUN SERPL-MCNC: 16 MG/DL (ref 8–27)
BUN/CREAT SERPL: 25 (ref 10–24)
CALCIUM SERPL-MCNC: 9.5 MG/DL (ref 8.6–10.2)
CHLORIDE SERPL-SCNC: 102 MMOL/L (ref 96–106)
CHOLEST SERPL-MCNC: 149 MG/DL (ref 100–199)
CO2 SERPL-SCNC: 26 MMOL/L (ref 20–29)
CREAT SERPL-MCNC: 0.64 MG/DL (ref 0.76–1.27)
EGFRCR SERPLBLD CKD-EPI 2021: 98 ML/MIN/1.73
EOSINOPHIL # BLD AUTO: 0.1 X10E3/UL (ref 0–0.4)
EOSINOPHIL NFR BLD AUTO: 2 %
ERYTHROCYTE [DISTWIDTH] IN BLOOD BY AUTOMATED COUNT: 12.6 % (ref 11.6–15.4)
GLOBULIN SER CALC-MCNC: 2.3 G/DL (ref 1.5–4.5)
GLUCOSE SERPL-MCNC: 148 MG/DL (ref 70–99)
HCT VFR BLD AUTO: 48 % (ref 37.5–51)
HDLC SERPL-MCNC: 49 MG/DL
HGB BLD-MCNC: 15.9 G/DL (ref 13–17.7)
IMM GRANULOCYTES # BLD AUTO: 0 X10E3/UL (ref 0–0.1)
IMM GRANULOCYTES NFR BLD AUTO: 0 %
LDLC SERPL CALC-MCNC: 78 MG/DL (ref 0–99)
LYMPHOCYTES # BLD AUTO: 1.8 X10E3/UL (ref 0.7–3.1)
LYMPHOCYTES NFR BLD AUTO: 33 %
MCH RBC QN AUTO: 29.6 PG (ref 26.6–33)
MCHC RBC AUTO-ENTMCNC: 33.1 G/DL (ref 31.5–35.7)
MCV RBC AUTO: 89 FL (ref 79–97)
MONOCYTES # BLD AUTO: 0.5 X10E3/UL (ref 0.1–0.9)
MONOCYTES NFR BLD AUTO: 9 %
NEUTROPHILS # BLD AUTO: 3.1 X10E3/UL (ref 1.4–7)
NEUTROPHILS NFR BLD AUTO: 55 %
PLATELET # BLD AUTO: 193 X10E3/UL (ref 150–450)
POTASSIUM SERPL-SCNC: 4.5 MMOL/L (ref 3.5–5.2)
PROT SERPL-MCNC: 6.8 G/DL (ref 6–8.5)
RBC # BLD AUTO: 5.37 X10E6/UL (ref 4.14–5.8)
SODIUM SERPL-SCNC: 141 MMOL/L (ref 134–144)
T4 FREE SERPL-MCNC: 1.31 NG/DL (ref 0.82–1.77)
TRIGL SERPL-MCNC: 124 MG/DL (ref 0–149)
TSH SERPL DL<=0.005 MIU/L-ACNC: 2.66 UIU/ML (ref 0.45–4.5)
VIT B12 SERPL-MCNC: 332 PG/ML (ref 232–1245)
VLDLC SERPL CALC-MCNC: 22 MG/DL (ref 5–40)
WBC # BLD AUTO: 5.5 X10E3/UL (ref 3.4–10.8)

## 2024-10-25 ENCOUNTER — OFFICE VISIT (OUTPATIENT)
Dept: FAMILY MEDICINE CLINIC | Facility: CLINIC | Age: 79
End: 2024-10-25
Payer: MEDICARE

## 2024-10-25 VITALS
OXYGEN SATURATION: 98 % | SYSTOLIC BLOOD PRESSURE: 140 MMHG | HEART RATE: 60 BPM | WEIGHT: 172 LBS | HEIGHT: 70 IN | BODY MASS INDEX: 24.62 KG/M2 | DIASTOLIC BLOOD PRESSURE: 86 MMHG

## 2024-10-25 DIAGNOSIS — H65.01 RIGHT ACUTE SEROUS OTITIS MEDIA, RECURRENCE NOT SPECIFIED: Primary | ICD-10-CM

## 2024-10-25 PROCEDURE — 3077F SYST BP >= 140 MM HG: CPT | Performed by: FAMILY MEDICINE

## 2024-10-25 PROCEDURE — 1126F AMNT PAIN NOTED NONE PRSNT: CPT | Performed by: FAMILY MEDICINE

## 2024-10-25 PROCEDURE — 3079F DIAST BP 80-89 MM HG: CPT | Performed by: FAMILY MEDICINE

## 2024-10-25 PROCEDURE — 99213 OFFICE O/P EST LOW 20 MIN: CPT | Performed by: FAMILY MEDICINE

## 2024-10-25 RX ORDER — AZELASTINE 1 MG/ML
1 SPRAY, METERED NASAL 2 TIMES DAILY
Qty: 1 EACH | Refills: 0 | Status: SHIPPED | OUTPATIENT
Start: 2024-10-25

## 2024-10-25 RX ORDER — CETIRIZINE HYDROCHLORIDE 10 MG/1
10 TABLET ORAL DAILY
Qty: 30 TABLET | Refills: 1 | Status: SHIPPED | OUTPATIENT
Start: 2024-10-25

## 2024-10-25 NOTE — PROGRESS NOTES
Follow Up Office Visit      Patient Name: Mahesh Renee  : 1945   MRN: [id number]     Chief Complaint:    Chief Complaint   Patient presents with    Ear Fullness     Feels full and pressure in his right ear about a month. He has hearing aids he does not wear often       History of Present Illness: Mahesh Renee is a 78 y.o. male who is here today for follow up with right ear fullness and pressure for the past month.  He has chronic allergies.    Subjective      Review of Systems:   Review of Systems   HENT:  Positive for congestion and ear pain. Negative for ear discharge.        The following portions of the patient's history were reviewed and updated as appropriate: allergies, current medications, past family history, past medical history, past social history, past surgical history and problem list.    Medications:     Current Outpatient Medications:     albuterol sulfate  (90 Base) MCG/ACT inhaler, Inhale 2 puffs Every 4 (Four) Hours As Needed for Wheezing., Disp: 18 g, Rfl: 5    amLODIPine (NORVASC) 5 MG tablet, Take 1 tablet by mouth Daily., Disp: 90 tablet, Rfl: 1    ammonium lactate (AMLACTIN) 12 % cream, APPLY ENOUGH TO COER AREAS OF DRY SKIN ONCE A DAY, Disp: , Rfl:     aspirin 81 MG EC tablet, Take 1 tablet by mouth Daily., Disp: , Rfl:     Breo Ellipta 200-25 MCG/ACT inhaler, Inhale 1 puff Daily., Disp: , Rfl:     Cholecalciferol (Vitamin D3) 50 MCG (2000 UT) tablet, Take 1 tablet by mouth Daily., Disp: , Rfl:     clindamycin (CLEOCIN T) 1 % lotion, APPLY TOPICALLY TO RASH DAILY, Disp: , Rfl:     empagliflozin (Jardiance) 25 MG tablet tablet, Take 1 tablet by mouth Every Morning., Disp: 90 tablet, Rfl: 1    finasteride (PROSCAR) 5 MG tablet, Take 1 tablet by mouth Daily., Disp: , Rfl:     glucose blood test strip, 1 each by Other route Daily. Used to test blood sugar once a day for non-insulin-dependent diabetes.  Dx E11.9 TRUEMETRIX TEST STRIPS, Disp: 100 each, Rfl: 3     "lisinopril (PRINIVIL,ZESTRIL) 40 MG tablet, Take 1 tablet by mouth Daily., Disp: 90 tablet, Rfl: 1    metFORMIN ER (GLUCOPHAGE-XR) 500 MG 24 hr tablet, Take 2 tablets by mouth Daily With Breakfast., Disp: 180 tablet, Rfl: 1    Misc Natural Products (PROSTATE HEALTH PO), Take  by mouth., Disp: , Rfl:     multivitamin (THERAGRAN) tablet tablet, Take 1 tablet by mouth Daily., Disp: , Rfl:     mupirocin (BACTROBAN) 2 % ointment, APPLY TOPICALLY TO AFFECTED AREAS ONCE DAILY, Disp: , Rfl:     Netarsudil-Latanoprost (Rocklatan) 0.02-0.005 % solution, Apply  to eye(s) as directed by provider., Disp: , Rfl:     rosuvastatin (CRESTOR) 5 MG tablet, Take 1 tablet by mouth Daily., Disp: 90 tablet, Rfl: 1    tamsulosin (FLOMAX) 0.4 MG capsule 24 hr capsule, Take 1 capsule by mouth Daily., Disp: , Rfl:     timolol (TIMOPTIC) 0.25 % ophthalmic solution, Administer 1 drop to both eyes 2 (Two) Times a Day., Disp: , Rfl:     Turmeric 450 MG capsule, turmeric, Disp: , Rfl:     azelastine (ASTELIN) 0.1 % nasal spray, Administer 1 spray into the nostril(s) as directed by provider 2 (Two) Times a Day., Disp: 1 each, Rfl: 0    cetirizine (zyrTEC) 10 MG tablet, Take 1 tablet by mouth Daily., Disp: 30 tablet, Rfl: 1    Allergies:   No Known Allergies    Objective     Physical Exam:  Vital Signs:   Vitals:    10/25/24 1200   BP: 140/86   BP Location: Right arm   Patient Position: Sitting   Cuff Size: Adult   Pulse: 60   SpO2: 98%   Weight: 78 kg (172 lb)   Height: 177.8 cm (70\")     Body mass index is 24.68 kg/m².   Facility age limit for growth %torres is 20 years.    Physical Exam  Vitals and nursing note reviewed.   Constitutional:       General: He is not in acute distress.     Appearance: Normal appearance. He is not ill-appearing or toxic-appearing.   HENT:      Right Ear: A middle ear effusion is present. Tympanic membrane is not injected or erythematous.   Neurological:      Mental Status: He is alert.         Procedures    PHQ-9 Total " Score:      Assessment / Plan      Assessment/Plan:   Assessment & Plan  Right acute serous otitis media, recurrence not specified  Continue Flonase.  Will add azelastine and nighttime cetirizine.  Also recommend warm compresses and massage of the right eustachian tube area.     New Medications Ordered This Visit   Medications    cetirizine (zyrTEC) 10 MG tablet     Sig: Take 1 tablet by mouth Daily.     Dispense:  30 tablet     Refill:  1    azelastine (ASTELIN) 0.1 % nasal spray     Sig: Administer 1 spray into the nostril(s) as directed by provider 2 (Two) Times a Day.     Dispense:  1 each     Refill:  0            BMI is within normal parameters. No other follow-up for BMI required.      Follow Up:   No follow-ups on file.      JESSICA Oneil MD  [facility]

## 2024-11-20 ENCOUNTER — OFFICE VISIT (OUTPATIENT)
Dept: FAMILY MEDICINE CLINIC | Facility: CLINIC | Age: 79
End: 2024-11-20
Payer: MEDICARE

## 2024-11-20 VITALS
HEIGHT: 70 IN | WEIGHT: 173.1 LBS | RESPIRATION RATE: 16 BRPM | OXYGEN SATURATION: 99 % | SYSTOLIC BLOOD PRESSURE: 146 MMHG | BODY MASS INDEX: 24.78 KG/M2 | DIASTOLIC BLOOD PRESSURE: 82 MMHG | HEART RATE: 65 BPM

## 2024-11-20 DIAGNOSIS — E55.9 VITAMIN D DEFICIENCY: ICD-10-CM

## 2024-11-20 DIAGNOSIS — R80.9 TYPE 2 DIABETES MELLITUS WITH MICROALBUMINURIA, WITHOUT LONG-TERM CURRENT USE OF INSULIN: ICD-10-CM

## 2024-11-20 DIAGNOSIS — I35.0 AORTIC STENOSIS, MILD: ICD-10-CM

## 2024-11-20 DIAGNOSIS — J30.1 SEASONAL ALLERGIC RHINITIS DUE TO POLLEN: ICD-10-CM

## 2024-11-20 DIAGNOSIS — E11.29 TYPE 2 DIABETES MELLITUS WITH MICROALBUMINURIA, WITHOUT LONG-TERM CURRENT USE OF INSULIN: ICD-10-CM

## 2024-11-20 DIAGNOSIS — I45.2 BIFASCICULAR BLOCK: ICD-10-CM

## 2024-11-20 DIAGNOSIS — H40.1130 PRIMARY OPEN ANGLE GLAUCOMA OF BOTH EYES, UNSPECIFIED GLAUCOMA STAGE: ICD-10-CM

## 2024-11-20 DIAGNOSIS — I10 ESSENTIAL HYPERTENSION: ICD-10-CM

## 2024-11-20 DIAGNOSIS — J45.20 MILD INTERMITTENT ASTHMA WITHOUT COMPLICATION: ICD-10-CM

## 2024-11-20 DIAGNOSIS — H69.91 DYSFUNCTION OF EUSTACHIAN TUBE, RIGHT: ICD-10-CM

## 2024-11-20 DIAGNOSIS — E78.2 MIXED HYPERLIPIDEMIA: ICD-10-CM

## 2024-11-20 DIAGNOSIS — Z00.00 GENERAL MEDICAL EXAM: Primary | ICD-10-CM

## 2024-11-20 DIAGNOSIS — I36.1 NONRHEUMATIC TRICUSPID VALVE REGURGITATION: ICD-10-CM

## 2024-11-20 DIAGNOSIS — N40.1 BPH WITH OBSTRUCTION/LOWER URINARY TRACT SYMPTOMS: ICD-10-CM

## 2024-11-20 DIAGNOSIS — N13.8 BPH WITH OBSTRUCTION/LOWER URINARY TRACT SYMPTOMS: ICD-10-CM

## 2024-11-20 LAB
EXPIRATION DATE: ABNORMAL
HBA1C MFR BLD: 6.8 % (ref 4.5–5.7)
Lab: ABNORMAL

## 2024-11-20 PROCEDURE — 1159F MED LIST DOCD IN RCRD: CPT | Performed by: FAMILY MEDICINE

## 2024-11-20 PROCEDURE — G0439 PPPS, SUBSEQ VISIT: HCPCS | Performed by: FAMILY MEDICINE

## 2024-11-20 PROCEDURE — 1125F AMNT PAIN NOTED PAIN PRSNT: CPT | Performed by: FAMILY MEDICINE

## 2024-11-20 PROCEDURE — 83036 HEMOGLOBIN GLYCOSYLATED A1C: CPT | Performed by: FAMILY MEDICINE

## 2024-11-20 PROCEDURE — 3077F SYST BP >= 140 MM HG: CPT | Performed by: FAMILY MEDICINE

## 2024-11-20 PROCEDURE — 96160 PT-FOCUSED HLTH RISK ASSMT: CPT | Performed by: FAMILY MEDICINE

## 2024-11-20 PROCEDURE — 99214 OFFICE O/P EST MOD 30 MIN: CPT | Performed by: FAMILY MEDICINE

## 2024-11-20 PROCEDURE — 1160F RVW MEDS BY RX/DR IN RCRD: CPT | Performed by: FAMILY MEDICINE

## 2024-11-20 PROCEDURE — 3044F HG A1C LEVEL LT 7.0%: CPT | Performed by: FAMILY MEDICINE

## 2024-11-20 PROCEDURE — 99397 PER PM REEVAL EST PAT 65+ YR: CPT | Performed by: FAMILY MEDICINE

## 2024-11-20 PROCEDURE — 3079F DIAST BP 80-89 MM HG: CPT | Performed by: FAMILY MEDICINE

## 2024-11-20 RX ORDER — AZELASTINE 1 MG/ML
2 SPRAY, METERED NASAL 2 TIMES DAILY
Qty: 30 ML | Refills: 5 | Status: SHIPPED | OUTPATIENT
Start: 2024-11-20

## 2024-11-20 RX ORDER — CETIRIZINE HYDROCHLORIDE 10 MG/1
10 TABLET ORAL DAILY
Qty: 30 TABLET | Refills: 5 | Status: SHIPPED | OUTPATIENT
Start: 2024-11-20

## 2024-11-20 RX ORDER — LISINOPRIL 40 MG/1
40 TABLET ORAL DAILY
Qty: 90 TABLET | Refills: 1 | Status: SHIPPED | OUTPATIENT
Start: 2024-11-20

## 2024-11-20 RX ORDER — METFORMIN HYDROCHLORIDE 500 MG/1
1000 TABLET, EXTENDED RELEASE ORAL
Qty: 180 TABLET | Refills: 1 | Status: SHIPPED | OUTPATIENT
Start: 2024-11-20

## 2024-11-20 RX ORDER — ROSUVASTATIN CALCIUM 5 MG/1
5 TABLET, COATED ORAL DAILY
Qty: 90 TABLET | Refills: 1 | Status: SHIPPED | OUTPATIENT
Start: 2024-11-20

## 2024-11-20 RX ORDER — AMLODIPINE BESYLATE 5 MG/1
5 TABLET ORAL DAILY
Qty: 90 TABLET | Refills: 1 | Status: SHIPPED | OUTPATIENT
Start: 2024-11-20

## 2024-11-20 RX ORDER — PREDNISONE 20 MG/1
1 TABLET ORAL DAILY
COMMUNITY
Start: 2024-11-11 | End: 2024-11-20

## 2024-11-20 RX ORDER — ALBUTEROL SULFATE 90 UG/1
2 INHALANT RESPIRATORY (INHALATION) EVERY 4 HOURS PRN
Qty: 18 G | Refills: 5 | Status: SHIPPED | OUTPATIENT
Start: 2024-11-20

## 2024-11-20 RX ORDER — ASPIRIN 81 MG/1
81 TABLET ORAL DAILY
Start: 2024-11-20

## 2024-11-20 RX ORDER — FLUTICASONE PROPIONATE 50 MCG
SPRAY, SUSPENSION (ML) NASAL
COMMUNITY
Start: 2024-10-29

## 2024-11-20 RX ORDER — VITAMIN B COMPLEX
TABLET ORAL
COMMUNITY
Start: 2024-07-12

## 2024-11-20 NOTE — ASSESSMENT & PLAN NOTE
Mild elevation today discussed    Problems with low BP and dizzy episodes earlier this year.    Restart home BP checks and notify us if staying over 140/90    Recheck at followup in March or sooner if problems arise

## 2024-11-20 NOTE — ASSESSMENT & PLAN NOTE
Discussed ongoing use of zyrtec, astelin and flonase    If not resolving then plan for ENT consult and consider tube placement

## 2024-11-20 NOTE — PROGRESS NOTES
Subjective   The ABCs of the Annual Wellness Visit  Medicare Wellness Visit      Mahesh Renee is a 79 y.o. patient who presents for a Medicare Wellness Visit.    The following portions of the patient's history were reviewed and   updated as appropriate: allergies, current medications, past family history, past medical history, past social history, past surgical history, and problem list.    Compared to one year ago, the patient's physical   health is the same.  Compared to one year ago, the patient's mental   health is the same.    Recent Hospitalizations:  He was not admitted to the hospital during the last year.     Current Medical Providers:  Patient Care Team:  Sarkis Fairbanks MD as PCP - General (Family Medicine)  Joseph Armstrong MD as Consulting Physician (Cardiology)  Hilario Miner MD (Ophthalmology)  Sherif Byers MD (Urology)    Outpatient Medications Prior to Visit   Medication Sig Dispense Refill    ammonium lactate (AMLACTIN) 12 % cream APPLY ENOUGH TO COER AREAS OF DRY SKIN ONCE A DAY      Breo Ellipta 200-25 MCG/ACT inhaler Inhale 1 puff Daily.      Cholecalciferol (Vitamin D3) 50 MCG (2000 UT) tablet Take 1 tablet by mouth Daily.      clindamycin (CLEOCIN T) 1 % lotion APPLY TOPICALLY TO RASH DAILY      Coenzyme Q10 (CoQ10) 100 MG capsule       Cyanocobalamin (B-12) 1000 MCG capsule       finasteride (PROSCAR) 5 MG tablet Take 1 tablet by mouth Daily.      fluticasone (FLONASE) 50 MCG/ACT nasal spray SHAKE LIQUID AND USE 2 SPRAYS IN EACH NOSTRIL EVERY DAY AS NEEDED      glucose blood test strip 1 each by Other route Daily. Used to test blood sugar once a day for non-insulin-dependent diabetes.  Dx E11.9 TRUEMETRIX TEST STRIPS 100 each 3    Misc Natural Products (PROSTATE HEALTH PO) Take  by mouth.      multivitamin (THERAGRAN) tablet tablet Take 1 tablet by mouth Daily.      mupirocin (BACTROBAN) 2 % ointment APPLY TOPICALLY TO AFFECTED AREAS ONCE DAILY       Netarsudil-Latanoprost (Rocklatan) 0.02-0.005 % solution Apply  to eye(s) as directed by provider.      tamsulosin (FLOMAX) 0.4 MG capsule 24 hr capsule Take 1 capsule by mouth Daily.      timolol (TIMOPTIC) 0.25 % ophthalmic solution Administer 1 drop to both eyes 2 (Two) Times a Day.      Turmeric 450 MG capsule turmeric      albuterol sulfate  (90 Base) MCG/ACT inhaler Inhale 2 puffs Every 4 (Four) Hours As Needed for Wheezing. 18 g 5    amLODIPine (NORVASC) 5 MG tablet Take 1 tablet by mouth Daily. 90 tablet 1    aspirin 81 MG EC tablet Take 1 tablet by mouth Daily.      azelastine (ASTELIN) 0.1 % nasal spray Administer 1 spray into the nostril(s) as directed by provider 2 (Two) Times a Day. 1 each 0    cetirizine (zyrTEC) 10 MG tablet Take 1 tablet by mouth Daily. 30 tablet 1    empagliflozin (Jardiance) 25 MG tablet tablet Take 1 tablet by mouth Every Morning. 90 tablet 1    lisinopril (PRINIVIL,ZESTRIL) 40 MG tablet Take 1 tablet by mouth Daily. 90 tablet 1    metFORMIN ER (GLUCOPHAGE-XR) 500 MG 24 hr tablet Take 2 tablets by mouth Daily With Breakfast. 180 tablet 1    predniSONE (DELTASONE) 20 MG tablet Take 1 tablet by mouth Daily.      rosuvastatin (CRESTOR) 5 MG tablet Take 1 tablet by mouth Daily. 90 tablet 1     No facility-administered medications prior to visit.     No opioid medication identified on active medication list. I have reviewed chart for other potential  high risk medication/s and harmful drug interactions in the elderly.      Aspirin is on active medication list. Aspirin use is indicated based on review of current medical condition/s. Pros and cons of this therapy have been discussed today. Benefits of this medication outweigh potential harm.  Patient has been encouraged to continue taking this medication.  .      Patient Active Problem List   Diagnosis    Essential hypertension    Type 2 diabetes mellitus with microalbuminuria, without long-term current use of insulin    Aortic  "stenosis, mild    Bifascicular block    Mild intermittent asthma    Tricuspid valve regurgitation    Vitamin D deficiency    Mixed hyperlipidemia    Primary open angle glaucoma (POAG) of both eyes    Seasonal allergic rhinitis due to pollen    BPH with obstruction/lower urinary tract symptoms    General medical exam    Dysfunction of Eustachian tube, right     Advance Care Planning Advance Directive is on file.  ACP discussion was held with the patient during this visit. Patient has an advance directive in EMR which is still valid.             Objective   Vitals:    24 0907   BP: 146/82   BP Location: Left arm   Patient Position: Sitting   Cuff Size: Adult   Pulse: 65   Resp: 16   SpO2: 99%   Weight: 78.5 kg (173 lb 1.6 oz)   Height: 177.8 cm (70\")   PainSc:   1   PainLoc: Back       Estimated body mass index is 24.84 kg/m² as calculated from the following:    Height as of this encounter: 177.8 cm (70\").    Weight as of this encounter: 78.5 kg (173 lb 1.6 oz).    BMI is within normal parameters. No other follow-up for BMI required.       Does the patient have evidence of cognitive impairment? No  Lab Results   Component Value Date    HGBA1C 6.8 (A) 2024                                                                                                Health  Risk Assessment    Smoking Status:  Social History     Tobacco Use   Smoking Status Never   Smokeless Tobacco Never     Alcohol Consumption:  Social History     Substance and Sexual Activity   Alcohol Use Never       Fall Risk Screen  STEADI Fall Risk Assessment was completed, and patient is at LOW risk for falls.Assessment completed on:2024    Depression Screening   Little interest or pleasure in doing things? Not at all   Feeling down, depressed, or hopeless? Not at all   PHQ-2 Total Score 0      Health Habits and Functional and Cognitive Screenin/13/2024     1:09 PM   Functional & Cognitive Status   Do you have difficulty preparing " food and eating? No    Do you have difficulty bathing yourself, getting dressed or grooming yourself? No    Do you have difficulty using the toilet? No    Do you have difficulty moving around from place to place? No    Do you have trouble with steps or getting out of a bed or a chair? No    Current Diet Well Balanced Diet    Dental Exam Up to date    Eye Exam Up to date    Exercise (times per week) 0 times per week    Current Exercises Include No Regular Exercise;Gardening;Walking;Yard Work    Do you need help using the phone?  No    Are you deaf or do you have serious difficulty hearing?  Yes    Do you need help to go to places out of walking distance? No    Do you need help shopping? No    Do you need help preparing meals?  No    Do you need help with housework?  No    Do you need help with laundry? No    Do you need help taking your medications? No    Do you need help managing money? No    Do you ever drive or ride in a car without wearing a seat belt? No    Have you felt unusual stress, anger or loneliness in the last month? No    Who do you live with? Alone    If you need help, do you have trouble finding someone available to you? Yes    Have you been bothered in the last four weeks by sexual problems? No    Do you have difficulty concentrating, remembering or making decisions? No        Patient-reported           Age-appropriate Screening Schedule:  Refer to the list below for future screening recommendations based on patient's age, sex and/or medical conditions. Orders for these recommended tests are listed in the plan section. The patient has been provided with a written plan.    Health Maintenance List  Health Maintenance   Topic Date Due    HEMOGLOBIN A1C  05/20/2025    DIABETIC EYE EXAM  06/19/2025    LIPID PANEL  07/16/2025    ANNUAL WELLNESS VISIT  11/20/2025    DIABETIC FOOT EXAM  11/20/2025    TDAP/TD VACCINES (3 - Td or Tdap) 09/02/2029    HEPATITIS C SCREENING  Completed    RSV Vaccine - Adults   Completed    INFLUENZA VACCINE  Completed    Pneumococcal Vaccine 65+  Completed    COVID-19 Vaccine  Discontinued    URINE MICROALBUMIN  Discontinued    ZOSTER VACCINE  Discontinued    COLORECTAL CANCER SCREENING  Discontinued                                                                                                                                                CMS Preventative Services Quick Reference  Risk Factors Identified During Encounter  Fall Risk-High or Moderate: Discussed Fall Prevention in the home    The above risks/problems have been discussed with the patient.  Pertinent information has been shared with the patient in the After Visit Summary.  An After Visit Summary and PPPS were made available to the patient.    Follow Up:   Next Medicare Wellness visit to be scheduled in 1 year.         Additional E&M Note during same encounter follows:  Patient has additional, significant, and separately identifiable condition(s)/problem(s) that require work above and beyond the Medicare Wellness Visit     Chief Complaint  Diabetes, Hypertension, and Hyperlipidemia    Subjective   HPI  Mahesh is also being seen today for an annual adult preventative physical exam.  and Mahesh is also being seen today for additional medical problem/s.  Diabetes, Hypertension, and Hyperlipidemia      Mahesh Renee is a 79 y.o. male who presents today for follow-up visit and medication refills.    He has been doing well since our last visit together but having recurrent R ear fullness.  Has been on antibiotics, zyrtec, flonase, and astelin.  His allergist added prednisone and that has helped some but still with pressure.  No hearing loss.  Discussed ENT eval and possible tube placement if it continues.     He continues to do well after we reduced his blood pressure regimen in 2023 with cessation of his hydrochlorothiazide given some low blood pressure readings and dizziness episodes.  His episodes have improved after we cut  "out his diuretic.  Mild elevation above goal today but home BP readings have been better.     History of diabetes, hypertension, hyperlipidemia, asthma on allergy shots, tricuspid regurgitation with chronic heart murmur and bifascicular block, BPH stable on Flomax with ongoing urology follow-up (Dr. Byers) with yearly PSA monitoring, and history of glaucoma with ongoing ophthalmology follow-up and management     Colonoscopy done last in 2016.  He did have 1 small polyp but was told to repeat in 10 years.  Obtained colon polyp pathology report that did return consistent with hyperplastic polyp.  Discussed he is not due for repeat until 2026 - if he would even like to continue colonoscopies at that time.       PSA up-to-date with urology.     Urine microalbumin positive 7/2024.  Continues on lisinopril.   Eye exam uptodate in Feb 2024    A1c today remains good at 6.8!          Review of Systems   Constitutional:  Negative for activity change, appetite change, chills, fatigue and fever.   HENT:  Negative for ear pain, hearing loss and trouble swallowing.         R ear fullness/pressure    Eyes:  Negative for pain and visual disturbance.   Respiratory:  Negative for cough, chest tightness, shortness of breath and wheezing.    Cardiovascular:  Negative for chest pain, palpitations and leg swelling.   Gastrointestinal:  Negative for abdominal pain and blood in stool.   Genitourinary:  Negative for difficulty urinating.   Musculoskeletal:  Negative for arthralgias, back pain and joint swelling.   Skin:  Negative for rash.   Neurological:  Negative for dizziness, weakness and light-headedness.   Psychiatric/Behavioral:  Negative for agitation, behavioral problems, dysphoric mood and sleep disturbance.               Objective   Vital Signs:  /82 (BP Location: Left arm, Patient Position: Sitting, Cuff Size: Adult)   Pulse 65   Resp 16   Ht 177.8 cm (70\")   Wt 78.5 kg (173 lb 1.6 oz)   SpO2 99%   BMI 24.84 kg/m² "   Physical Exam  Constitutional:       Appearance: Normal appearance.   HENT:      Head: Normocephalic.      Right Ear: Ear canal and external ear normal.      Left Ear: Tympanic membrane, ear canal and external ear normal.      Ears:      Comments: Sm serous effusion on R.  No bulging TM.  No canal obstruction     Nose: Nose normal.   Eyes:      Pupils: Pupils are equal, round, and reactive to light.   Cardiovascular:      Rate and Rhythm: Normal rate and regular rhythm.      Heart sounds: Normal heart sounds. Murmur heard.   Pulmonary:      Effort: Pulmonary effort is normal.      Breath sounds: Normal breath sounds.   Musculoskeletal:         General: Normal range of motion.      Cervical back: Normal range of motion.      Right foot: Normal range of motion. No deformity, bunion or foot drop.      Left foot: Normal range of motion. No deformity, bunion or foot drop.   Skin:     General: Skin is warm and dry.   Neurological:      General: No focal deficit present.      Mental Status: He is alert.   Psychiatric:         Mood and Affect: Mood normal.         Behavior: Behavior normal.         Thought Content: Thought content normal.                       Assessment and Plan       Diagnoses and all orders for this visit:    1. General medical exam (Primary)  Assessment & Plan:  Discussed together health maintenance and screening along with vaccination options and healthy diet and exercise habits as part of the preventative counseling at their physical exam today.       2. Type 2 diabetes mellitus with microalbuminuria, without long-term current use of insulin  Assessment & Plan:  Diabetes is improving with treatment.   Continue current treatment regimen.  Diabetes will be reassessed  4 mos .    Orders:  -     POCT glycated hemoglobin, total  -     aspirin 81 MG EC tablet; Take 1 tablet by mouth Daily.  -     empagliflozin (Jardiance) 25 MG tablet tablet; Take 1 tablet by mouth Every Morning.  Dispense: 90 tablet;  Refill: 1  -     lisinopril (PRINIVIL,ZESTRIL) 40 MG tablet; Take 1 tablet by mouth Daily.  Dispense: 90 tablet; Refill: 1  -     metFORMIN ER (GLUCOPHAGE-XR) 500 MG 24 hr tablet; Take 2 tablets by mouth Daily With Breakfast.  Dispense: 180 tablet; Refill: 1  -     rosuvastatin (CRESTOR) 5 MG tablet; Take 1 tablet by mouth Daily.  Dispense: 90 tablet; Refill: 1    3. Essential hypertension  Assessment & Plan:  Mild elevation today discussed    Problems with low BP and dizzy episodes earlier this year.    Restart home BP checks and notify us if staying over 140/90    Recheck at followup in March or sooner if problems arise     Orders:  -     amLODIPine (NORVASC) 5 MG tablet; Take 1 tablet by mouth Daily.  Dispense: 90 tablet; Refill: 1  -     lisinopril (PRINIVIL,ZESTRIL) 40 MG tablet; Take 1 tablet by mouth Daily.  Dispense: 90 tablet; Refill: 1    4. Mixed hyperlipidemia  Assessment & Plan:   Lipid abnormalities are improving with treatment    Plan:  Continue same medication/s without change.      Discussed medication dosage, use, side effects, and goals of treatment in detail.    Counseled patient on lifestyle modifications to help control hyperlipidemia.     Patient Treatment Goals:   LDL goal is under 100    Followup at the next regular appointment.    Orders:  -     rosuvastatin (CRESTOR) 5 MG tablet; Take 1 tablet by mouth Daily.  Dispense: 90 tablet; Refill: 1    5. Mild intermittent asthma without complication  Assessment & Plan:  Asthma is improving with treatment.  The patient is experiencing no daytime asthma symptoms. He is experiencing no nighttime asthma symptoms.  Continue current regimen        Orders:  -     albuterol sulfate  (90 Base) MCG/ACT inhaler; Inhale 2 puffs Every 4 (Four) Hours As Needed for Wheezing.  Dispense: 18 g; Refill: 5    6. Seasonal allergic rhinitis due to pollen  Assessment & Plan:  Stable control with current regimen.      7. BPH with obstruction/lower urinary tract  symptoms  Assessment & Plan:  Stable control with current regimen.      Continues with proscar and flomax.     PSA monitoring ongoing with urology at this time.       8. Aortic stenosis, mild  Assessment & Plan:  Stable murmur.  Ongoing cardiology follow-up      9. Nonrheumatic tricuspid valve regurgitation  Assessment & Plan:  Stable murmur.  Ongoing cardiology follow-up      10. Vitamin D deficiency  Assessment & Plan:  Cont vit D      11. Primary open angle glaucoma of both eyes, unspecified glaucoma stage  Assessment & Plan:  Continued follow-up and management with ophthalmology.        12. Bifascicular block  Assessment & Plan:  Heart rate in the 60s.  He does report they told him he would need a pacemaker at some point in the future but at this point is stable without intervention      13. Dysfunction of Eustachian tube, right  Assessment & Plan:  Discussed ongoing use of zyrtec, astelin and flonase    If not resolving then plan for ENT consult and consider tube placement    Orders:  -     azelastine (ASTELIN) 0.1 % nasal spray; Administer 2 sprays into the nostril(s) as directed by provider 2 (Two) Times a Day.  Dispense: 30 mL; Refill: 5  -     cetirizine (zyrTEC) 10 MG tablet; Take 1 tablet by mouth Daily.  Dispense: 30 tablet; Refill: 5             Follow Up   Return in about 4 months (around 3/20/2025) for Med recheck.  Patient was given instructions and counseling regarding his condition or for health maintenance advice. Please see specific information pulled into the AVS if appropriate.

## 2024-12-23 DIAGNOSIS — H69.91 DYSFUNCTION OF EUSTACHIAN TUBE, RIGHT: ICD-10-CM

## 2024-12-23 RX ORDER — CETIRIZINE HYDROCHLORIDE 10 MG/1
10 TABLET ORAL DAILY
Qty: 30 TABLET | Refills: 5 | Status: SHIPPED | OUTPATIENT
Start: 2024-12-23

## 2025-02-25 DIAGNOSIS — R80.9 TYPE 2 DIABETES MELLITUS WITH MICROALBUMINURIA, WITHOUT LONG-TERM CURRENT USE OF INSULIN: ICD-10-CM

## 2025-02-25 DIAGNOSIS — E11.29 TYPE 2 DIABETES MELLITUS WITH MICROALBUMINURIA, WITHOUT LONG-TERM CURRENT USE OF INSULIN: ICD-10-CM

## 2025-02-25 NOTE — TELEPHONE ENCOUNTER
Requested Prescriptions:   Requested Prescriptions     Pending Prescriptions Disp Refills    empagliflozin (Jardiance) 25 MG tablet tablet 90 tablet 1     Sig: Take 1 tablet by mouth Every Morning.        Pharmacy where request should be sent: [id number] - [place], [place] - [address]  & [address] - [phone]  - [phone]     Last office visit with prescribing clinician: 11/20/2024   Last telemedicine visit with prescribing clinician: Visit date not found   Next office visit with prescribing clinician: 3/21/2025       Kleber Rodgers Rep   02/25/25 09:23 EST

## 2025-03-21 ENCOUNTER — OFFICE VISIT (OUTPATIENT)
Dept: FAMILY MEDICINE CLINIC | Facility: CLINIC | Age: 80
End: 2025-03-21
Payer: MEDICARE

## 2025-03-21 VITALS
SYSTOLIC BLOOD PRESSURE: 142 MMHG | BODY MASS INDEX: 24.62 KG/M2 | DIASTOLIC BLOOD PRESSURE: 72 MMHG | HEART RATE: 63 BPM | HEIGHT: 70 IN | OXYGEN SATURATION: 99 % | WEIGHT: 172 LBS

## 2025-03-21 DIAGNOSIS — J45.20 MILD INTERMITTENT ASTHMA WITHOUT COMPLICATION: ICD-10-CM

## 2025-03-21 DIAGNOSIS — E11.29 TYPE 2 DIABETES MELLITUS WITH MICROALBUMINURIA, WITHOUT LONG-TERM CURRENT USE OF INSULIN: Primary | ICD-10-CM

## 2025-03-21 DIAGNOSIS — E78.2 MIXED HYPERLIPIDEMIA: ICD-10-CM

## 2025-03-21 DIAGNOSIS — R80.9 TYPE 2 DIABETES MELLITUS WITH MICROALBUMINURIA, WITHOUT LONG-TERM CURRENT USE OF INSULIN: Primary | ICD-10-CM

## 2025-03-21 DIAGNOSIS — I10 ESSENTIAL HYPERTENSION: ICD-10-CM

## 2025-03-21 DIAGNOSIS — I35.0 AORTIC STENOSIS, MILD: ICD-10-CM

## 2025-03-21 DIAGNOSIS — H40.1130 PRIMARY OPEN ANGLE GLAUCOMA OF BOTH EYES, UNSPECIFIED GLAUCOMA STAGE: ICD-10-CM

## 2025-03-21 DIAGNOSIS — N13.8 BPH WITH OBSTRUCTION/LOWER URINARY TRACT SYMPTOMS: ICD-10-CM

## 2025-03-21 DIAGNOSIS — J30.1 SEASONAL ALLERGIC RHINITIS DUE TO POLLEN: ICD-10-CM

## 2025-03-21 DIAGNOSIS — N40.1 BPH WITH OBSTRUCTION/LOWER URINARY TRACT SYMPTOMS: ICD-10-CM

## 2025-03-21 LAB
EXPIRATION DATE: ABNORMAL
HBA1C MFR BLD: 6.6 % (ref 4.5–5.7)
Lab: ABNORMAL

## 2025-03-21 RX ORDER — METFORMIN HYDROCHLORIDE 500 MG/1
1000 TABLET, EXTENDED RELEASE ORAL
Qty: 180 TABLET | Refills: 1 | Status: SHIPPED | OUTPATIENT
Start: 2025-03-21

## 2025-03-21 RX ORDER — AMLODIPINE BESYLATE 5 MG/1
5 TABLET ORAL DAILY
Qty: 90 TABLET | Refills: 1 | Status: SHIPPED | OUTPATIENT
Start: 2025-03-21

## 2025-03-21 RX ORDER — LISINOPRIL 40 MG/1
40 TABLET ORAL DAILY
Qty: 90 TABLET | Refills: 1 | Status: SHIPPED | OUTPATIENT
Start: 2025-03-21

## 2025-03-21 RX ORDER — ROSUVASTATIN CALCIUM 5 MG/1
5 TABLET, COATED ORAL DAILY
Qty: 90 TABLET | Refills: 1 | Status: SHIPPED | OUTPATIENT
Start: 2025-03-21

## 2025-03-21 RX ORDER — ASPIRIN 81 MG/1
81 TABLET ORAL DAILY
Start: 2025-03-21

## 2025-03-21 NOTE — ASSESSMENT & PLAN NOTE
Hypertension is stable and controlled  Continue current treatment regimen.  Regular aerobic exercise.  Blood pressure will be reassessed  at next regular appointment .

## 2025-03-21 NOTE — ASSESSMENT & PLAN NOTE
Continued follow-up and management with ophthalmology.     Returned call to Deirdre with [facility]. She states that she discovered his authorization was extended. His requested dates that were first signed was 06/05-09/02 and the dates were update to 7/1-9/28 instead. She states she faxed over the paperwork again and would like Dr. Block to resign it. I advised her that I will give the paperwork to her and refax it once completed. She verbalized understanding.

## 2025-03-21 NOTE — PROGRESS NOTES
"Chief Complaint  Diabetes, Hypertension, and Hyperlipidemia    Subjective    History of Present Illness:  Mahesh Renee is a 79 y.o. male who presents today for follow-up visit and medication refills.    He has been doing well since our last visit together for his AWV in Nov 2024.    Still having problems with eustachian tube dysfunction and does have a consult scheduled in April with Dr. Mane to consider tube placement    He continues to do well after we reduced his blood pressure regimen in 2023 with cessation of his hydrochlorothiazide given some low blood pressure readings and dizziness episodes.  His episodes have improved after we cut out his diuretic.  Mild elevation above goal today but home BP readings have been better.     History of diabetes, hypertension, hyperlipidemia, asthma on allergy shots, tricuspid regurgitation with chronic heart murmur and bifascicular block, BPH stable on Flomax with ongoing urology follow-up (Dr. Byers) with yearly PSA monitoring, and history of glaucoma with ongoing ophthalmology follow-up and management     Colonoscopy done last in 2016.  He did have 1 small polyp but was told to repeat in 10 years.  Obtained colon polyp pathology report that did return consistent with hyperplastic polyp.  Discussed he is not due for repeat until 2026 - if he would even like to continue colonoscopies at that time.       PSA up-to-date with urology.     Urine microalbumin positive 7/2024.  Continues on lisinopril.   Eye exam uptodate in Feb 2024    A1c today remains good at 6.6!        Objective   Vital Signs:   /72   Pulse 63   Ht 177.8 cm (70\")   Wt 78 kg (172 lb)   SpO2 99%   BMI 24.68 kg/m²     Review of Systems   Constitutional:  Negative for appetite change, chills and fever.   HENT:  Negative for hearing loss.         Current ear fullness and eustachian tube symptoms.  Appointment next month with Dr. Mane scheduled   Eyes:  Negative for blurred vision.   Respiratory:  " Negative for chest tightness.    Cardiovascular:  Negative for chest pain.   Gastrointestinal:  Negative for abdominal pain.   Musculoskeletal:  Negative for gait problem.   Skin:  Negative for rash.   Psychiatric/Behavioral:  Negative for depressed mood.        Past History:  Medical History: has a past medical history of Acute allergic rhinitis, Benign essential hypertension, Benign prostatic hyperplasia with urinary obstruction, Bifascicular block, BMI 23.0-23.9, adult, Cholecystitis, Chronic allergic rhinitis, Diabetes mellitus without complication, Erectile dysfunction, Extrinsic asthma, Glaucoma, Heart murmur, High risk medication use, HL (hearing loss), Low back pain, Mild intermittent asthma without complication, Mixed hyperlipidemia, Nocturia, Nonrheumatic tricuspid valve regurgitation, Obstructive sleep apnea syndrome, Other obstructive and reflux uropathy, Secondary pulmonary arterial hypertension, Tricuspid valve insufficiency, and Vitamin D deficiency.   Surgical History: has a past surgical history that includes Other surgical history; Cholecystectomy; Inguinal hernia repair; and Eye surgery.   Family History: family history includes Arthritis in his father and mother; Breast cancer in his mother and sister; Coronary artery disease in his father; Diabetes in his sister; Hyperlipidemia in his father, mother, and sister; Hypertension in his father, mother, and sister; Stroke in his mother.   Social History: reports that he has never smoked. He has never used smokeless tobacco. He reports that he does not drink alcohol and does not use drugs.      Current Outpatient Medications:     albuterol sulfate  (90 Base) MCG/ACT inhaler, Inhale 2 puffs Every 4 (Four) Hours As Needed for Wheezing., Disp: 18 g, Rfl: 5    amLODIPine (NORVASC) 5 MG tablet, Take 1 tablet by mouth Daily., Disp: 90 tablet, Rfl: 1    ammonium lactate (AMLACTIN) 12 % cream, APPLY ENOUGH TO COER AREAS OF DRY SKIN ONCE A DAY, Disp: ,  Rfl:     aspirin 81 MG EC tablet, Take 1 tablet by mouth Daily., Disp: , Rfl:     azelastine (ASTELIN) 0.1 % nasal spray, Administer 2 sprays into the nostril(s) as directed by provider 2 (Two) Times a Day., Disp: 30 mL, Rfl: 5    Breo Ellipta 200-25 MCG/ACT inhaler, Inhale 1 puff Daily., Disp: , Rfl:     cetirizine (zyrTEC) 10 MG tablet, TAKE 1 TABLET BY MOUTH ONCE DAILY, Disp: 30 tablet, Rfl: 5    Cholecalciferol (Vitamin D3) 50 MCG (2000 UT) tablet, Take 1 tablet by mouth Daily., Disp: , Rfl:     clindamycin (CLEOCIN T) 1 % lotion, APPLY TOPICALLY TO RASH DAILY, Disp: , Rfl:     Coenzyme Q10 (CoQ10) 100 MG capsule, , Disp: , Rfl:     Cyanocobalamin (B-12) 1000 MCG capsule, , Disp: , Rfl:     empagliflozin (Jardiance) 25 MG tablet tablet, Take 1 tablet by mouth Every Morning., Disp: 90 tablet, Rfl: 1    finasteride (PROSCAR) 5 MG tablet, Take 1 tablet by mouth Daily., Disp: , Rfl:     fluticasone (FLONASE) 50 MCG/ACT nasal spray, SHAKE LIQUID AND USE 2 SPRAYS IN EACH NOSTRIL EVERY DAY AS NEEDED, Disp: , Rfl:     glucose blood test strip, 1 each by Other route Daily. Used to test blood sugar once a day for non-insulin-dependent diabetes.  Dx E11.9 TRUEMETRIX TEST STRIPS, Disp: 100 each, Rfl: 3    lisinopril (PRINIVIL,ZESTRIL) 40 MG tablet, Take 1 tablet by mouth Daily., Disp: 90 tablet, Rfl: 1    metFORMIN ER (GLUCOPHAGE-XR) 500 MG 24 hr tablet, Take 2 tablets by mouth Daily With Breakfast., Disp: 180 tablet, Rfl: 1    Misc Natural Products (PROSTATE HEALTH PO), Take  by mouth., Disp: , Rfl:     multivitamin (THERAGRAN) tablet tablet, Take 1 tablet by mouth Daily., Disp: , Rfl:     mupirocin (BACTROBAN) 2 % ointment, APPLY TOPICALLY TO AFFECTED AREAS ONCE DAILY, Disp: , Rfl:     Netarsudil-Latanoprost (Rocklatan) 0.02-0.005 % solution, Apply  to eye(s) as directed by provider., Disp: , Rfl:     rosuvastatin (CRESTOR) 5 MG tablet, Take 1 tablet by mouth Daily., Disp: 90 tablet, Rfl: 1    tamsulosin (FLOMAX) 0.4 MG  capsule 24 hr capsule, Take 1 capsule by mouth Daily., Disp: , Rfl:     timolol (TIMOPTIC) 0.25 % ophthalmic solution, Administer 1 drop to both eyes 2 (Two) Times a Day., Disp: , Rfl:     Turmeric 450 MG capsule, turmeric, Disp: , Rfl:     Allergies: Patient has no known allergies.    Physical Exam  Constitutional:       Appearance: Normal appearance.   HENT:      Head: Normocephalic.      Right Ear: Ear canal and external ear normal.      Left Ear: Tympanic membrane, ear canal and external ear normal.      Ears:      Comments: Moderate serous right effusion     Nose: Nose normal.   Eyes:      Pupils: Pupils are equal, round, and reactive to light.   Cardiovascular:      Rate and Rhythm: Normal rate and regular rhythm.      Heart sounds: Murmur heard.      No friction rub. No gallop.   Pulmonary:      Effort: Pulmonary effort is normal.      Breath sounds: Normal breath sounds.   Musculoskeletal:         General: Normal range of motion.      Cervical back: Normal range of motion.   Skin:     General: Skin is warm and dry.   Neurological:      General: No focal deficit present.      Mental Status: He is alert.   Psychiatric:         Mood and Affect: Mood normal.         Behavior: Behavior normal.         Thought Content: Thought content normal.          Result Review                   Assessment and Plan  Diagnoses and all orders for this visit:    1. Type 2 diabetes mellitus with microalbuminuria, without long-term current use of insulin (Primary)  Assessment & Plan:  Diabetes is improving with treatment.   Continue current treatment regimen.  Diabetes will be reassessed  4 mos .    Orders:  -     POCT glycated hemoglobin, total  -     Cancel: POC Albumin/Creatinine Ratio Urine  -     aspirin 81 MG EC tablet; Take 1 tablet by mouth Daily.  -     empagliflozin (Jardiance) 25 MG tablet tablet; Take 1 tablet by mouth Every Morning.  Dispense: 90 tablet; Refill: 1  -     lisinopril (PRINIVIL,ZESTRIL) 40 MG tablet; Take  1 tablet by mouth Daily.  Dispense: 90 tablet; Refill: 1  -     metFORMIN ER (GLUCOPHAGE-XR) 500 MG 24 hr tablet; Take 2 tablets by mouth Daily With Breakfast.  Dispense: 180 tablet; Refill: 1  -     rosuvastatin (CRESTOR) 5 MG tablet; Take 1 tablet by mouth Daily.  Dispense: 90 tablet; Refill: 1  -     CBC & Differential; Future  -     Comprehensive Metabolic Panel; Future  -     Lipid Panel; Future  -     TSH; Future  -     T4, Free; Future  -     T4, Free  -     TSH  -     Lipid Panel  -     Comprehensive Metabolic Panel  -     CBC & Differential    2. Essential hypertension  Assessment & Plan:  Hypertension is stable and controlled  Continue current treatment regimen.  Regular aerobic exercise.  Blood pressure will be reassessed  at next regular appointment .    Orders:  -     amLODIPine (NORVASC) 5 MG tablet; Take 1 tablet by mouth Daily.  Dispense: 90 tablet; Refill: 1  -     lisinopril (PRINIVIL,ZESTRIL) 40 MG tablet; Take 1 tablet by mouth Daily.  Dispense: 90 tablet; Refill: 1  -     CBC & Differential; Future  -     Comprehensive Metabolic Panel; Future  -     Lipid Panel; Future  -     TSH; Future  -     T4, Free; Future  -     T4, Free  -     TSH  -     Lipid Panel  -     Comprehensive Metabolic Panel  -     CBC & Differential    3. Mixed hyperlipidemia  Assessment & Plan:   Lipid abnormalities are improving with treatment    Plan:  Continue same medication/s without change.      Discussed medication dosage, use, side effects, and goals of treatment in detail.    Counseled patient on lifestyle modifications to help control hyperlipidemia.     Patient Treatment Goals:   LDL goal is under 100    Followup at the next regular appointment.    Orders:  -     rosuvastatin (CRESTOR) 5 MG tablet; Take 1 tablet by mouth Daily.  Dispense: 90 tablet; Refill: 1  -     CBC & Differential; Future  -     Comprehensive Metabolic Panel; Future  -     Lipid Panel; Future  -     TSH; Future  -     T4, Free; Future  -      T4, Free  -     TSH  -     Lipid Panel  -     Comprehensive Metabolic Panel  -     CBC & Differential    4. Mild intermittent asthma without complication  Assessment & Plan:  Asthma is improving with treatment.  The patient is experiencing no daytime asthma symptoms. He is experiencing no nighttime asthma symptoms.  Continue current regimen          5. Seasonal allergic rhinitis due to pollen  Assessment & Plan:  Stable control with current regimen.      6. BPH with obstruction/lower urinary tract symptoms  Assessment & Plan:  Stable control with current regimen.      Continues with proscar and flomax.     PSA monitoring ongoing with urology at this time.       7. Aortic stenosis, mild  Assessment & Plan:  Stable murmur.  Ongoing cardiology follow-up      8. Primary open angle glaucoma of both eyes, unspecified glaucoma stage  Assessment & Plan:  Continued follow-up and management with ophthalmology.            BMI is within normal parameters. No other follow-up for BMI required.          Follow Up  Return in about 4 months (around 7/21/2025) for Med recheck.    Sarkis Fairbanks MD

## 2025-03-22 LAB
ALBUMIN SERPL-MCNC: 4.7 G/DL (ref 3.8–4.8)
ALP SERPL-CCNC: 64 IU/L (ref 44–121)
ALT SERPL-CCNC: 16 IU/L (ref 0–44)
AST SERPL-CCNC: 17 IU/L (ref 0–40)
BASOPHILS # BLD AUTO: 0.1 X10E3/UL (ref 0–0.2)
BASOPHILS NFR BLD AUTO: 1 %
BILIRUB SERPL-MCNC: 0.6 MG/DL (ref 0–1.2)
BUN SERPL-MCNC: 13 MG/DL (ref 8–27)
BUN/CREAT SERPL: 20 (ref 10–24)
CALCIUM SERPL-MCNC: 9.4 MG/DL (ref 8.6–10.2)
CHLORIDE SERPL-SCNC: 102 MMOL/L (ref 96–106)
CHOLEST SERPL-MCNC: 137 MG/DL (ref 100–199)
CO2 SERPL-SCNC: 24 MMOL/L (ref 20–29)
CREAT SERPL-MCNC: 0.66 MG/DL (ref 0.76–1.27)
EGFRCR SERPLBLD CKD-EPI 2021: 97 ML/MIN/1.73
EOSINOPHIL # BLD AUTO: 0.2 X10E3/UL (ref 0–0.4)
EOSINOPHIL NFR BLD AUTO: 3 %
ERYTHROCYTE [DISTWIDTH] IN BLOOD BY AUTOMATED COUNT: 12.6 % (ref 11.6–15.4)
GLOBULIN SER CALC-MCNC: 2.3 G/DL (ref 1.5–4.5)
GLUCOSE SERPL-MCNC: 94 MG/DL (ref 70–99)
HCT VFR BLD AUTO: 49.8 % (ref 37.5–51)
HDLC SERPL-MCNC: 50 MG/DL
HGB BLD-MCNC: 16.1 G/DL (ref 13–17.7)
IMM GRANULOCYTES # BLD AUTO: 0 X10E3/UL (ref 0–0.1)
IMM GRANULOCYTES NFR BLD AUTO: 0 %
LDLC SERPL CALC-MCNC: 66 MG/DL (ref 0–99)
LYMPHOCYTES # BLD AUTO: 2.1 X10E3/UL (ref 0.7–3.1)
LYMPHOCYTES NFR BLD AUTO: 36 %
MCH RBC QN AUTO: 28.9 PG (ref 26.6–33)
MCHC RBC AUTO-ENTMCNC: 32.3 G/DL (ref 31.5–35.7)
MCV RBC AUTO: 89 FL (ref 79–97)
MONOCYTES # BLD AUTO: 0.5 X10E3/UL (ref 0.1–0.9)
MONOCYTES NFR BLD AUTO: 9 %
NEUTROPHILS # BLD AUTO: 3.1 X10E3/UL (ref 1.4–7)
NEUTROPHILS NFR BLD AUTO: 51 %
PLATELET # BLD AUTO: 192 X10E3/UL (ref 150–450)
POTASSIUM SERPL-SCNC: 3.9 MMOL/L (ref 3.5–5.2)
PROT SERPL-MCNC: 7 G/DL (ref 6–8.5)
RBC # BLD AUTO: 5.57 X10E6/UL (ref 4.14–5.8)
SODIUM SERPL-SCNC: 143 MMOL/L (ref 134–144)
T4 FREE SERPL-MCNC: 1.18 NG/DL (ref 0.82–1.77)
TRIGL SERPL-MCNC: 115 MG/DL (ref 0–149)
TSH SERPL DL<=0.005 MIU/L-ACNC: 1.76 UIU/ML (ref 0.45–4.5)
VLDLC SERPL CALC-MCNC: 21 MG/DL (ref 5–40)
WBC # BLD AUTO: 6 X10E3/UL (ref 3.4–10.8)

## 2025-04-16 ENCOUNTER — TELEPHONE (OUTPATIENT)
Dept: CARDIOLOGY | Facility: CLINIC | Age: 80
End: 2025-04-16
Payer: MEDICARE

## 2025-04-16 NOTE — TELEPHONE ENCOUNTER
Left message for patient scheduling his Echo 5/12/25 9am, and his follow up with Dr Armstrong is still at 5/28 at 10 am. HUB can relay the message.

## 2025-05-09 ENCOUNTER — TELEPHONE (OUTPATIENT)
Dept: CARDIOLOGY | Facility: CLINIC | Age: 80
End: 2025-05-09
Payer: MEDICARE

## 2025-05-28 ENCOUNTER — OFFICE VISIT (OUTPATIENT)
Dept: CARDIOLOGY | Facility: CLINIC | Age: 80
End: 2025-05-28
Payer: MEDICARE

## 2025-05-28 VITALS
OXYGEN SATURATION: 99 % | RESPIRATION RATE: 18 BRPM | HEIGHT: 70 IN | HEART RATE: 69 BPM | SYSTOLIC BLOOD PRESSURE: 178 MMHG | WEIGHT: 170 LBS | DIASTOLIC BLOOD PRESSURE: 84 MMHG | BODY MASS INDEX: 24.34 KG/M2

## 2025-05-28 DIAGNOSIS — I45.2 BIFASCICULAR BLOCK: Chronic | ICD-10-CM

## 2025-05-28 DIAGNOSIS — I35.0 AORTIC STENOSIS, MILD: Primary | Chronic | ICD-10-CM

## 2025-05-28 DIAGNOSIS — E78.2 MIXED HYPERLIPIDEMIA: Chronic | ICD-10-CM

## 2025-05-28 DIAGNOSIS — I10 ESSENTIAL HYPERTENSION: Chronic | ICD-10-CM

## 2025-05-28 PROBLEM — Z00.00 GENERAL MEDICAL EXAM: Status: RESOLVED | Noted: 2022-11-07 | Resolved: 2025-05-28

## 2025-05-28 PROCEDURE — 99214 OFFICE O/P EST MOD 30 MIN: CPT | Performed by: INTERNAL MEDICINE

## 2025-05-28 PROCEDURE — 3079F DIAST BP 80-89 MM HG: CPT | Performed by: INTERNAL MEDICINE

## 2025-05-28 PROCEDURE — 1159F MED LIST DOCD IN RCRD: CPT | Performed by: INTERNAL MEDICINE

## 2025-05-28 PROCEDURE — 93000 ELECTROCARDIOGRAM COMPLETE: CPT | Performed by: INTERNAL MEDICINE

## 2025-05-28 PROCEDURE — 3077F SYST BP >= 140 MM HG: CPT | Performed by: INTERNAL MEDICINE

## 2025-05-28 PROCEDURE — 1160F RVW MEDS BY RX/DR IN RCRD: CPT | Performed by: INTERNAL MEDICINE

## 2025-05-28 RX ORDER — HYDRALAZINE HYDROCHLORIDE 25 MG/1
25 TABLET, FILM COATED ORAL 2 TIMES DAILY
Qty: 180 TABLET | Refills: 3 | Status: SHIPPED | OUTPATIENT
Start: 2025-05-28

## 2025-05-28 NOTE — PROGRESS NOTES
[facility]  [facility]  [address] [place] [address]  Dept: [phone]  Dept Fax: [phone]    Mahesh Renee  1945    Follow Up Office Visit Note    History of Present Illness:  Mahesh Renee is a 79 y.o. male who presents to the clinic for Aortic stenosis - He denies any complaints, CP, SOB, palpitations, recent echo MARSHA 1,1 mean gradient 22 and aortic velocity 3,0m, no major changes, will watch, will get an echo in 1 year, but his BP is high 150.80 despite Lisinopril 40 mg and also Amlodipine 5mg, will add Hydralaizne 25 bid, he has bifascicular block, his HR is 59 and no changes compare with prior EKG    The following portions of the patient's history were reviewed and updated as appropriate: allergies, current medications, past family history, past medical history, past social history, past surgical history, and problem list.    Medications:  albuterol sulfate HFA  amLODIPine  ammonium lactate  aspirin  azelastine  B-12 capsule  Breo Ellipta aerosol powder   cetirizine  clindamycin  CoQ10 capsule  empagliflozin tablet  finasteride  fluticasone  glucose blood  hydrALAZINE  lisinopril  metFORMIN ER  multivitamin tablet  mupirocin  PROSTATE HEALTH PO  Rocklatan solution  rosuvastatin  tamsulosin capsule  timolol  Turmeric capsule  Vitamin D3 tablet    Subjective  No Known Allergies     Past Medical History:   Diagnosis Date   • Acute allergic rhinitis    • Benign essential hypertension    • Benign prostatic hyperplasia with urinary obstruction    • Bifascicular block    • BMI 23.0-23.9, adult    • Cholecystitis    • Chronic allergic rhinitis    • Diabetes mellitus without complication    • Erectile dysfunction    • Extrinsic asthma    • Glaucoma    • Heart murmur    • High risk medication use    • HL (hearing loss)    • Low back pain    • Mild intermittent asthma without complication    • Mixed hyperlipidemia    • Nocturia    • Nonrheumatic tricuspid  valve regurgitation    • Obstructive sleep apnea syndrome    • Other obstructive and reflux uropathy    • Secondary pulmonary arterial hypertension    • Tricuspid valve insufficiency    • Vitamin D deficiency        Past Surgical History:   Procedure Laterality Date   • CHOLECYSTECTOMY     • EYE SURGERY     • INGUINAL HERNIA REPAIR     • OTHER SURGICAL HISTORY      UVULA SURGER       Family History   Problem Relation Age of Onset   • Stroke Mother    • Hypertension Mother    • Hyperlipidemia Mother    • Arthritis Mother    • Breast cancer Mother    • Hypertension Father    • Arthritis Father    • Hyperlipidemia Father    • Coronary artery disease Father    • Breast cancer Sister    • Hypertension Sister    • Diabetes Sister    • Hyperlipidemia Sister         Social History     Socioeconomic History   • Marital status:    Tobacco Use   • Smoking status: Never   • Smokeless tobacco: Never   Vaping Use   • Vaping status: Never Used   Substance and Sexual Activity   • Alcohol use: Never   • Drug use: Never   • Sexual activity: Not Currently     Partners: Female       Review of Systems   Constitutional: Negative.    HENT: Negative.     Respiratory: Negative.     Cardiovascular: Negative.    Endocrine: Negative.    Genitourinary: Negative.    Musculoskeletal: Negative.    Skin: Negative.    Allergic/Immunologic: Negative.    Neurological: Negative.    Hematological: Negative.    Psychiatric/Behavioral: Negative.       Cardiovascular Procedures    ECHO/MUGA:  STRESS TESTS:   CARDIAC CATH:   DEVICES:   HOLTER:   CT/MRI:   VASCULAR:   CARDIOTHORACIC:     Objective  Vitals:    05/28/25 1004 05/28/25 1011 05/28/25 1012 05/28/25 1015   BP: 170/84 174/80 170/84 178/84   BP Location: Right arm Right arm Right arm Right arm   Patient Position: Lying  Comment: little dizzy Standing  Comment: dizzy  1min Standing  Comment: 3 min dizzy Standing  Comment: 5 min dizzy   Cuff Size: Adult Adult Adult Adult   Pulse: 74 64 68 69  "  Resp: 18      SpO2: 99%      Weight: 77.1 kg (170 lb)      Height: 177.8 cm (70\")      PainSc: 0-No pain        Body mass index is 24.39 kg/m².     Physical Exam  Vitals reviewed.   Constitutional:       Appearance: Healthy appearance. Not in distress.   Eyes:      Pupils: Pupils are equal, round, and reactive to light.   HENT:    Mouth/Throat:      Pharynx: Oropharynx is clear.   Neck:      Thyroid: Thyroid normal.      Vascular: No JVR. JVD normal.   Pulmonary:      Effort: Pulmonary effort is normal.      Breath sounds: Normal breath sounds. No wheezing. No rhonchi. No rales.   Chest:      Chest wall: Not tender to palpatation.   Cardiovascular:      PMI at left midclavicular line. Normal rate. Regular rhythm. Normal S1. Normal S2.       Murmurs: There is a systolic murmur.      No gallop.  No click. No rub.   Pulses:     Intact distal pulses.      Carotid: 3+ bilaterally.     Radial: 3+ bilaterally.     Femoral: 3+ bilaterally.     Dorsalis pedis: 3+ bilaterally.     Posterior tibial: 3+ bilaterally.  Edema:     Peripheral edema absent.   Abdominal:      General: Bowel sounds are normal.      Palpations: Abdomen is soft.      Tenderness: There is no abdominal tenderness.   Musculoskeletal: Normal range of motion.         General: No tenderness.      Cervical back: Normal range of motion and neck supple. Skin:     General: Skin is warm and dry.   Neurological:      General: No focal deficit present.      Mental Status: Alert and oriented to person, place and time.        Diagnostic Data    ECG 12 Lead    Date/Time: 5/28/2025 10:46 AM  Performed by: Joseph Armstrong MD    Authorized by: Joseph Armstrong MD  Comparison: compared with previous ECG from 5/28/2024  Similar to previous ECG  Rhythm: sinus rhythm  Rate: normal  BPM: 59  Conduction: right bundle branch block and left anterior fascicular block  QRS axis: left    Clinical impression: abnormal EKG        Assessment and Plan  Diagnoses and all " orders for this visit:    Aortic stenosis, Moderate moderate to severe, no major changes Aortic velocity 3,0, mean gradient 22  -     Adult Transthoracic Echo Complete W/ Cont if Necessary Per Protocol; Future    Bifascicular block- Hr is steady 59.  RBBB and lASHB- no changes no syncope    Essential hypertension- BP is 150.80, on Lisinopril 40 mg and also Amlodipine 5mg, will add Hydralazine 25 bid    Mixed hyperlipidemia- On Crestor 5mg., LDL is 66    Other orders  -     hydrALAZINE (APRESOLINE) 25 MG tablet; Take 1 tablet by mouth 2 (Two) Times a Day.         Return in about 1 year (around 5/28/2026) for Recheck with Dr. Armstrong.    Joseph Armstrong MD  05/28/2025

## 2025-08-13 ENCOUNTER — OFFICE VISIT (OUTPATIENT)
Dept: FAMILY MEDICINE CLINIC | Facility: CLINIC | Age: 80
End: 2025-08-13
Payer: MEDICARE

## 2025-08-13 VITALS
WEIGHT: 174.1 LBS | BODY MASS INDEX: 24.92 KG/M2 | SYSTOLIC BLOOD PRESSURE: 126 MMHG | HEIGHT: 70 IN | OXYGEN SATURATION: 99 % | HEART RATE: 60 BPM | DIASTOLIC BLOOD PRESSURE: 82 MMHG

## 2025-08-13 DIAGNOSIS — E78.2 MIXED HYPERLIPIDEMIA: ICD-10-CM

## 2025-08-13 DIAGNOSIS — E11.29 TYPE 2 DIABETES MELLITUS WITH MICROALBUMINURIA, WITHOUT LONG-TERM CURRENT USE OF INSULIN: Primary | ICD-10-CM

## 2025-08-13 DIAGNOSIS — I36.1 NONRHEUMATIC TRICUSPID VALVE REGURGITATION: Chronic | ICD-10-CM

## 2025-08-13 DIAGNOSIS — N13.8 BPH WITH OBSTRUCTION/LOWER URINARY TRACT SYMPTOMS: ICD-10-CM

## 2025-08-13 DIAGNOSIS — N40.1 BPH WITH OBSTRUCTION/LOWER URINARY TRACT SYMPTOMS: ICD-10-CM

## 2025-08-13 DIAGNOSIS — J30.1 SEASONAL ALLERGIC RHINITIS DUE TO POLLEN: ICD-10-CM

## 2025-08-13 DIAGNOSIS — H40.1130 PRIMARY OPEN ANGLE GLAUCOMA OF BOTH EYES, UNSPECIFIED GLAUCOMA STAGE: ICD-10-CM

## 2025-08-13 DIAGNOSIS — R80.9 TYPE 2 DIABETES MELLITUS WITH MICROALBUMINURIA, WITHOUT LONG-TERM CURRENT USE OF INSULIN: Primary | ICD-10-CM

## 2025-08-13 DIAGNOSIS — J45.20 MILD INTERMITTENT ASTHMA WITHOUT COMPLICATION: ICD-10-CM

## 2025-08-13 DIAGNOSIS — I10 ESSENTIAL HYPERTENSION: ICD-10-CM

## 2025-08-13 DIAGNOSIS — I35.0 AORTIC STENOSIS, MILD: ICD-10-CM

## 2025-08-13 LAB
EXPIRATION DATE: ABNORMAL
EXPIRATION DATE: ABNORMAL
HBA1C MFR BLD: 7.2 % (ref 4.5–5.7)
Lab: ABNORMAL
Lab: ABNORMAL
POC ALBUMIN, URINE: 10 MG/L
POC CREATININE, URINE: 50 MG/DL
POC URINE ALB/CREA RATIO: ABNORMAL

## 2025-08-13 RX ORDER — BLOOD-GLUCOSE METER
1 KIT MISCELLANEOUS DAILY
Qty: 1 EACH | Refills: 0 | Status: SHIPPED | OUTPATIENT
Start: 2025-08-13

## 2025-08-13 RX ORDER — METFORMIN HYDROCHLORIDE 500 MG/1
1000 TABLET, EXTENDED RELEASE ORAL
Qty: 180 TABLET | Refills: 1 | Status: SHIPPED | OUTPATIENT
Start: 2025-08-13

## 2025-08-13 RX ORDER — ROSUVASTATIN CALCIUM 5 MG/1
5 TABLET, COATED ORAL DAILY
Qty: 90 TABLET | Refills: 1 | Status: SHIPPED | OUTPATIENT
Start: 2025-08-13

## 2025-08-13 RX ORDER — AMLODIPINE BESYLATE 5 MG/1
5 TABLET ORAL DAILY
Qty: 90 TABLET | Refills: 1 | Status: SHIPPED | OUTPATIENT
Start: 2025-08-13

## 2025-08-13 RX ORDER — ASPIRIN 81 MG/1
81 TABLET ORAL DAILY
Start: 2025-08-13

## 2025-08-13 RX ORDER — LISINOPRIL 40 MG/1
40 TABLET ORAL DAILY
Qty: 90 TABLET | Refills: 1 | Status: SHIPPED | OUTPATIENT
Start: 2025-08-13